# Patient Record
Sex: MALE | Race: WHITE | NOT HISPANIC OR LATINO | Employment: OTHER | URBAN - METROPOLITAN AREA
[De-identification: names, ages, dates, MRNs, and addresses within clinical notes are randomized per-mention and may not be internally consistent; named-entity substitution may affect disease eponyms.]

---

## 2022-11-02 ENCOUNTER — OFFICE VISIT (OUTPATIENT)
Dept: URGENT CARE | Facility: CLINIC | Age: 42
End: 2022-11-02
Payer: OTHER GOVERNMENT

## 2022-11-02 VITALS
HEART RATE: 82 BPM | DIASTOLIC BLOOD PRESSURE: 89 MMHG | SYSTOLIC BLOOD PRESSURE: 126 MMHG | WEIGHT: 238 LBS | RESPIRATION RATE: 20 BRPM | OXYGEN SATURATION: 98 % | BODY MASS INDEX: 31.54 KG/M2 | HEIGHT: 73 IN | TEMPERATURE: 98 F

## 2022-11-02 DIAGNOSIS — J06.9 URI WITH COUGH AND CONGESTION: Primary | ICD-10-CM

## 2022-11-02 DIAGNOSIS — R89.4 INFLUENZA A VIRUS NOT DETECTED: ICD-10-CM

## 2022-11-02 DIAGNOSIS — Z20.822 COVID-19 VIRUS NOT DETECTED: ICD-10-CM

## 2022-11-02 LAB
CTP QC/QA: YES
CTP QC/QA: YES
FLUAV AG NPH QL: NEGATIVE
FLUBV AG NPH QL: NEGATIVE
SARS-COV-2 AG RESP QL IA.RAPID: NEGATIVE

## 2022-11-02 PROCEDURE — 87804 POCT INFLUENZA A/B: ICD-10-PCS | Mod: QW,,, | Performed by: NURSE PRACTITIONER

## 2022-11-02 PROCEDURE — 99204 PR OFFICE/OUTPT VISIT, NEW, LEVL IV, 45-59 MIN: ICD-10-PCS | Mod: S$GLB,,, | Performed by: NURSE PRACTITIONER

## 2022-11-02 PROCEDURE — 87811 SARS-COV-2 COVID19 W/OPTIC: CPT | Mod: QW,S$GLB,, | Performed by: NURSE PRACTITIONER

## 2022-11-02 PROCEDURE — 87804 INFLUENZA ASSAY W/OPTIC: CPT | Mod: QW,,, | Performed by: NURSE PRACTITIONER

## 2022-11-02 PROCEDURE — 87811 SARS CORONAVIRUS 2 ANTIGEN POCT, MANUAL READ: ICD-10-PCS | Mod: QW,S$GLB,, | Performed by: NURSE PRACTITIONER

## 2022-11-02 PROCEDURE — 99204 OFFICE O/P NEW MOD 45 MIN: CPT | Mod: S$GLB,,, | Performed by: NURSE PRACTITIONER

## 2022-11-02 RX ORDER — FLUTICASONE PROPIONATE 50 MCG
2 SPRAY, SUSPENSION (ML) NASAL DAILY PRN
Qty: 15.8 ML | Refills: 0 | Status: SHIPPED | OUTPATIENT
Start: 2022-11-02 | End: 2022-11-16

## 2022-11-02 RX ORDER — PROMETHAZINE HYDROCHLORIDE AND DEXTROMETHORPHAN HYDROBROMIDE 6.25; 15 MG/5ML; MG/5ML
5 SYRUP ORAL NIGHTLY PRN
Qty: 118 ML | Refills: 0 | Status: SHIPPED | OUTPATIENT
Start: 2022-11-02 | End: 2022-11-12

## 2022-11-02 RX ORDER — BROMPHENIRAM/PHENYLEPHRINE/DM 1-2.5-5/5
20 SOLUTION, ORAL ORAL EVERY 4 HOURS PRN
Qty: 118 ML | Refills: 0 | Status: SHIPPED | OUTPATIENT
Start: 2022-11-02 | End: 2022-11-09

## 2022-11-02 NOTE — PATIENT INSTRUCTIONS
Increase clear fluid intake  Stop all current over the counter cough, cold, flu medicine  Tylenol/motrin otc for fever or pain  Flonase nasal spray and Dimetap syrup for sinus congestion and pressure.  Take Mucinex DM as needed for chest congestion and coughing. Take mucinex with a full glass of water at each dose  May take promethazine dm cough syrup for severe nighttime cough.  Saltwater gargles 4 x daily and benzocaine anesthetic throat lozenges for sore throat. May also add honey based cough syrup  Follow up with PCP  Go immediately to the nearest emergency room for shortness of breath, chest pain,  or other emergent concern.  Return to clinic for new, worse, or unresolving symptoms

## 2022-11-02 NOTE — PROGRESS NOTES
"Subjective:       Patient ID: Jinny Rosado is a 42 y.o. male.    Vitals:  height is 6' 1" (1.854 m) and weight is 108 kg (238 lb). His temperature is 98 °F (36.7 °C). His blood pressure is 126/89 and his pulse is 82. His respiration is 20 and oxygen saturation is 98%.     Chief Complaint: Chest Congestion    Pt states" c/o daughter was Dx with flu last week. Chest congestion, cough, fatigue, chills, feels hot and flushed that has been going on for 2 days."       Constitution: Positive for chills. Negative for fever.   HENT:  Positive for congestion and sore throat. Negative for ear pain and sinus pressure.    Neck: Negative for painful lymph nodes.   Cardiovascular:  Negative for chest pain, palpitations and sob on exertion.   Respiratory:  Positive for cough and sputum production. Negative for shortness of breath.    Gastrointestinal:  Negative for abdominal pain, nausea, vomiting and diarrhea.   Musculoskeletal:  Negative for muscle ache.   Skin:  Negative for rash.   Neurological:  Negative for dizziness, light-headedness, passing out, disorientation and altered mental status.   Hematologic/Lymphatic: Negative for swollen lymph nodes.   Psychiatric/Behavioral:  Negative for altered mental status, disorientation and confusion.      Objective:      Physical Exam   Constitutional: He is oriented to person, place, and time. He appears well-developed. He is cooperative.  Non-toxic appearance. He does not appear ill. No distress.   HENT:   Head: Normocephalic and atraumatic.   Ears:   Right Ear: Hearing, tympanic membrane, external ear and ear canal normal.   Left Ear: Hearing, tympanic membrane, external ear and ear canal normal.   Nose: Rhinorrhea present. No mucosal edema, nasal deformity or congestion. No epistaxis. Right sinus exhibits no maxillary sinus tenderness and no frontal sinus tenderness. Left sinus exhibits no maxillary sinus tenderness and no frontal sinus tenderness.   Mouth/Throat: Uvula is " midline and mucous membranes are normal. No trismus in the jaw. Normal dentition. No uvula swelling. Posterior oropharyngeal erythema present. No oropharyngeal exudate, posterior oropharyngeal edema, tonsillar abscesses or cobblestoning. Tonsils are 1+ on the right. Tonsils are 1+ on the left. No tonsillar exudate.   Eyes: Conjunctivae and lids are normal. No scleral icterus.   Neck: Trachea normal and phonation normal. Neck supple. No edema present. No erythema present. No neck rigidity present.   Cardiovascular: Normal rate, regular rhythm, normal heart sounds and normal pulses.   Pulmonary/Chest: Effort normal and breath sounds normal. No respiratory distress. He has no decreased breath sounds. He has no rhonchi.   Abdominal: Normal appearance.   Musculoskeletal: Normal range of motion.         General: No deformity. Normal range of motion.   Lymphadenopathy:     He has no cervical adenopathy.   Neurological: He is alert and oriented to person, place, and time. He exhibits normal muscle tone. Coordination normal.   Skin: Skin is warm, dry, intact, not diaphoretic and not pale. Capillary refill takes 2 to 3 seconds.   Psychiatric: His speech is normal and behavior is normal. Judgment and thought content normal.   Nursing note and vitals reviewed.      Assessment:       1. URI with cough and congestion    2. COVID-19 virus not detected    3. Influenza A virus not detected          Plan:         URI with cough and congestion  -     POCT Influenza A/B  -     SARS Coronavirus 2 Antigen, POCT Manual Read  -     benzocaine-menthoL 6-10 mg lozenge; Take 1 lozenge by mouth every 2 (two) hours as needed for Pain.  Dispense: 18 tablet; Refill: 0  -     brompheniramine-phenylephrine (DIMETAPP COLD-ALLERGY, PE,) 1-2.5 mg/5 mL Soln; Take 20 mLs by mouth every 4 (four) hours as needed (cough/congestion).  Dispense: 118 mL; Refill: 0  -     fluticasone propionate (FLONASE) 50 mcg/actuation nasal spray; 2 sprays (100 mcg total)  by Each Nostril route daily as needed for Rhinitis.  Dispense: 15.8 mL; Refill: 0  -     promethazine-dextromethorphan (PROMETHAZINE-DM) 6.25-15 mg/5 mL Syrp; Take 5 mLs by mouth nightly as needed (cough). Take as needed for nighttime coughing  Dispense: 118 mL; Refill: 0  -     dextromethorphan-guaiFENesin  mg (MUCINEX DM)  mg per 12 hr tablet; Take 1 tablet by mouth 2 (two) times daily as needed (cough/congestion).  Dispense: 14 tablet; Refill: 0    COVID-19 virus not detected    Influenza A virus not detected     I have discussed the test results and physical exam findings with the patient. We discussed symptom monitoring, conservative care methods, medication use, and follow up orders. The patient verbalized understanding and agreement with the plan of care.        Increase clear fluid intake  Stop all current over the counter cough, cold, flu medicine  Tylenol/motrin otc for fever or pain  Flonase nasal spray and Dimetap syrup for sinus congestion and pressure.  Take Mucinex DM as needed for chest congestion and coughing. Take mucinex with a full glass of water at each dose  May take promethazine dm cough syrup for severe nighttime cough.  Saltwater gargles 4 x daily and benzocaine anesthetic throat lozenges for sore throat. May also add honey based cough syrup  Follow up with PCP  Go immediately to the nearest emergency room for shortness of breath, chest pain,  or other emergent concern.  Return to clinic for new, worse, or unresolving symptoms

## 2023-02-06 ENCOUNTER — OFFICE VISIT (OUTPATIENT)
Dept: URGENT CARE | Facility: CLINIC | Age: 43
End: 2023-02-06
Payer: OTHER GOVERNMENT

## 2023-02-06 VITALS
RESPIRATION RATE: 16 BRPM | SYSTOLIC BLOOD PRESSURE: 126 MMHG | DIASTOLIC BLOOD PRESSURE: 90 MMHG | OXYGEN SATURATION: 97 % | BODY MASS INDEX: 32.2 KG/M2 | TEMPERATURE: 98 F | WEIGHT: 243 LBS | HEIGHT: 73 IN | HEART RATE: 96 BPM

## 2023-02-06 DIAGNOSIS — L03.213 PRESEPTAL CELLULITIS OF RIGHT LOWER EYELID: ICD-10-CM

## 2023-02-06 DIAGNOSIS — H10.9 CONJUNCTIVITIS, UNSPECIFIED CONJUNCTIVITIS TYPE, UNSPECIFIED LATERALITY: Primary | ICD-10-CM

## 2023-02-06 PROCEDURE — 99214 OFFICE O/P EST MOD 30 MIN: CPT | Mod: S$GLB,,, | Performed by: NURSE PRACTITIONER

## 2023-02-06 PROCEDURE — 99214 PR OFFICE/OUTPT VISIT, EST, LEVL IV, 30-39 MIN: ICD-10-PCS | Mod: S$GLB,,, | Performed by: NURSE PRACTITIONER

## 2023-02-06 RX ORDER — CLINDAMYCIN HYDROCHLORIDE 300 MG/1
300 CAPSULE ORAL EVERY 6 HOURS
Qty: 20 CAPSULE | Refills: 0 | Status: SHIPPED | OUTPATIENT
Start: 2023-02-06 | End: 2023-02-11

## 2023-02-06 RX ORDER — OFLOXACIN 3 MG/ML
1 SOLUTION/ DROPS OPHTHALMIC 4 TIMES DAILY
Qty: 5 ML | Refills: 0 | Status: SHIPPED | OUTPATIENT
Start: 2023-02-06 | End: 2023-02-13

## 2023-02-06 NOTE — LETTER
February 6, 2023      [facility]  [address]  [facility] [address]  Phone: [phone]       Patient: Jinny Rosado   YOB: 1980  Date of Visit: 02/06/2023    To Whom It May Concern:    Bishnu Rosado  was at [facility] on 02/06/2023. The patient may return to work/school after 24 hours of antibiotics and/or symptoms are improving. If you have any questions or concerns, or if I can be of further assistance, please do not hesitate to contact me.    Sincerely,    Celi Saleh, NP

## 2023-02-06 NOTE — PATIENT INSTRUCTIONS
Clindamycin 3 times a day for 5 days.  It is always advised to take probiotics for intestinal health over-the-counter while taking any antibiotic.    Ofloxacin eyedrops as prescribed.    As we discussed please seek medical re-evaluation if symptoms worsen or fail to improve after 48-72 hours on medications, onset of fever/chills/nausea/vomiting/diarrhea, onset of eye pain or pain with movement or visual changes

## 2023-02-06 NOTE — PROGRESS NOTES
"Subjective:       Patient ID: Jinny Rosado is a 43 y.o. male.    Vitals:  height is 6' 1" (1.854 m) and weight is 110.2 kg (243 lb). His temperature is 97.6 °F (36.4 °C). His blood pressure is 126/90 (abnormal) and his pulse is 96. His respiration is 16 and oxygen saturation is 97%.     Chief Complaint: Eye Problem    Pt states Friday afternoon right eye became inflamed and fed, states it is painful has 3 bumps on eye. Denies itching.     Onset of symptoms 4 days.  Denies injury or trauma.  Denies sensation of foreign body.  Denies wearing contacts or any known precipitating event or occurrence    Constitution: Negative for chills and fever.   Eyes:  Positive for eye redness and eyelid swelling (Right lower lid). Negative for eye trauma, foreign body in eye, eye discharge (excessive water), eye itching, eye pain, photophobia, vision loss, double vision and blurred vision.   Gastrointestinal:  Negative for nausea, vomiting and diarrhea.     Objective:      Physical Exam   Constitutional: He is oriented to person, place, and time. He appears well-developed.  Non-toxic appearance. He does not appear ill. No distress.   HENT:   Head: Normocephalic and atraumatic.   Nose: Nose normal.   Mouth/Throat: Oropharynx is clear and moist. Mucous membranes are moist.   Eyes: EOM are normal. Pupils are equal, round, and reactive to light. Right eye exhibits no chemosis, no discharge, no exudate and no hordeolum. No foreign body present in the right eye. Left eye exhibits no discharge and no hordeolum. Right conjunctiva is injected. Right conjunctiva has no hemorrhage. Right eye exhibits normal extraocular motion.   Slit lamp exam:       The right eye shows no fluorescein uptake. Extraocular movement intact vision grossly intact      Comments: See attached photo   Cardiovascular: Normal rate.   Pulmonary/Chest: Effort normal. No respiratory distress.   Abdominal: Normal appearance.   Neurological: no focal deficit. He is alert " and oriented to person, place, and time.   Skin: Skin is warm and dry. Capillary refill takes 2 to 3 seconds.   Psychiatric: His behavior is normal. Mood normal.   Nursing note and vitals reviewed.      Assessment:       1. Conjunctivitis, unspecified conjunctivitis type, unspecified laterality    2. Preseptal cellulitis of right lower eyelid        Wood's lamp exam in clinic negative with no fluorescein uptake  Plan:         Conjunctivitis, unspecified conjunctivitis type, unspecified laterality  -     ofloxacin (OCUFLOX) 0.3 % ophthalmic solution; Place 1 drop into the right eye 4 (four) times daily. for 7 days  Dispense: 5 mL; Refill: 0    Preseptal cellulitis of right lower eyelid  -     clindamycin (CLEOCIN) 300 MG capsule; Take 1 capsule (300 mg total) by mouth every 6 (six) hours. for 5 days  Dispense: 20 capsule; Refill: 0

## 2023-04-19 ENCOUNTER — TELEPHONE (OUTPATIENT)
Dept: HEMATOLOGY/ONCOLOGY | Facility: CLINIC | Age: 43
End: 2023-04-19

## 2023-04-19 NOTE — TELEPHONE ENCOUNTER
----- Message from Sue Jimenez sent at 4/17/2023 10:20 AM CDT -----  The patient has a new patient referral from [facility] in [facility] and he called about scheduling an appointment.

## 2023-05-16 DIAGNOSIS — M79.641 BILATERAL HAND PAIN: Primary | ICD-10-CM

## 2023-05-16 DIAGNOSIS — M79.642 BILATERAL HAND PAIN: Primary | ICD-10-CM

## 2023-05-17 ENCOUNTER — TELEPHONE (OUTPATIENT)
Dept: ORTHOPEDICS | Facility: CLINIC | Age: 43
End: 2023-05-17
Payer: OTHER GOVERNMENT

## 2023-05-17 NOTE — TELEPHONE ENCOUNTER
Spoke with the patient. Informed of X-rays scheduled prior to appointment. Patient verbalized understanding and was thankful.       Xochilt Shine MA  Medical Assistant to Dr. Ross Dunbar [facility]

## 2023-05-18 ENCOUNTER — OFFICE VISIT (OUTPATIENT)
Dept: ORTHOPEDICS | Facility: CLINIC | Age: 43
End: 2023-05-18
Payer: OTHER GOVERNMENT

## 2023-05-18 ENCOUNTER — HOSPITAL ENCOUNTER (OUTPATIENT)
Dept: RADIOLOGY | Facility: HOSPITAL | Age: 43
Discharge: HOME | End: 2023-05-18
Attending: ORTHOPAEDIC SURGERY
Payer: OTHER GOVERNMENT

## 2023-05-18 DIAGNOSIS — M79.641 BILATERAL HAND PAIN: ICD-10-CM

## 2023-05-18 DIAGNOSIS — G56.03 BILATERAL CARPAL TUNNEL SYNDROME: Primary | ICD-10-CM

## 2023-05-18 DIAGNOSIS — G56.23 CUBITAL TUNNEL SYNDROME, BILATERAL: ICD-10-CM

## 2023-05-18 DIAGNOSIS — M79.642 BILATERAL HAND PAIN: ICD-10-CM

## 2023-05-18 PROCEDURE — 73130 X-RAY EXAM OF HAND: CPT | Mod: TC,50,FY

## 2023-05-18 PROCEDURE — 99212 OFFICE O/P EST SF 10 MIN: CPT | Mod: PBBFAC,PO | Performed by: ORTHOPAEDIC SURGERY

## 2023-05-18 PROCEDURE — 99999 PR PBB SHADOW E&M-EST. PATIENT-LVL II: ICD-10-PCS | Mod: PBBFAC,,, | Performed by: ORTHOPAEDIC SURGERY

## 2023-05-18 PROCEDURE — 99999 PR PBB SHADOW E&M-EST. PATIENT-LVL II: CPT | Mod: PBBFAC,,, | Performed by: ORTHOPAEDIC SURGERY

## 2023-05-18 PROCEDURE — 99204 PR OFFICE/OUTPT VISIT, NEW, LEVL IV, 45-59 MIN: ICD-10-PCS | Mod: S$PBB,,, | Performed by: ORTHOPAEDIC SURGERY

## 2023-05-18 PROCEDURE — 73130 XR HAND COMPLETE 3 VIEWS BILATERAL: ICD-10-PCS | Mod: 26,50,, | Performed by: RADIOLOGY

## 2023-05-18 PROCEDURE — 73130 X-RAY EXAM OF HAND: CPT | Mod: 26,50,, | Performed by: RADIOLOGY

## 2023-05-18 PROCEDURE — 99204 OFFICE O/P NEW MOD 45 MIN: CPT | Mod: S$PBB,,, | Performed by: ORTHOPAEDIC SURGERY

## 2023-05-18 NOTE — PROGRESS NOTES
[facility]  History & Physical  Orthopedics    SUBJECTIVE:      COVID-19 attestation:  This patient was treated during the COVID-19 pandemic.  This was discussed with the patient, they are aware of our current policies and procedures, were given the option of delaying their visit and or switching to a virtual visit, delaying their surgery when applicable, and they elect to proceed.    Chief Complaint:  Bilateral hand pain numbness and tingling    Referring Provider: Self, Aaareferral     History of Present Illness:  Patient is a 43 y.o. ambidextrous male who presents today with complaints of bilateral hand pain numbness and tingling.  The patient notes constant tingling sensations worst to the bilateral thumb index and long fingers.  This has been going on for several years.  He has attempted ibuprofen and nocturnal carpal tunnel bracing with minimal relief.  He presents today for initial evaluation with no other complaints.     The patient is a/an  with the [employer].    Onset of symptoms/DOI was several years ago.    Symptoms are aggravated by activity and movement.    Symptoms are alleviated by nothing    Symptoms consist of pain and numbness/tingling.    The patient rates their pain as a 3/10.    Attempted treatment(s) and/or interventions include activity modifications, rest, anti-inflammatory medications and immobilization.     The patient denies any fevers, chills, N/V, D/C and presents for evaluation.       No past medical history on file.  No past surgical history on file.  Review of patient's allergies indicates:  No Known Allergies  Social History     Social History Narrative    Not on file     No family history on file.    No current outpatient medications on file.      Review of Systems:  As per HPI otherwise noncontributory    OBJECTIVE:      Vital Signs (Most Recent):  There were no vitals filed for this visit.  There is no height or weight on file to calculate  BMI.      Physical Exam:  Constitutional: The patient appears well-developed and well-nourished. No distress.   Skin: No lesions appreciated  Head: Normocephalic and atraumatic.   Nose: Nose normal.   Ears: No deformities seen  Eyes: Conjunctivae and EOM are normal.   Neck: No tracheal deviation present.   Cardiovascular: Normal rate and intact distal pulses.    Pulmonary/Chest: Effort normal. No respiratory distress.   Abdominal: There is no guarding.   Neurological: The patient is alert.   Psychiatric: The patient has a normal mood and affect.     Bilateral Hand/Wrist Examination:    Observation/Inspection:  Swelling  none    Deformity  none  Discoloration  none     Scars   none    Atrophy  none    HAND/WRIST EXAMINATION:  Finkelstein's Test   Neg  WHAT Test    Neg  Snuff box tenderness   Neg  Barber's Test    Neg  Hook of Hamate Tenderness  Neg  CMC grind    Neg  Circumduction test   Neg    Neurovascular Exam:  Digits WWP, brisk CR < 3s throughout  NVI motor/LTS to M/R/U nerves, radial pulse 2+  Tinel's Test - Carpal Tunnel  positive  Tinel's Test - Cubital Tunnel  positive  Phalen's Test    positive  Median Nerve Compression Test positive    ROM hand full, painless    ROM wrist full, painless    ROM elbow full, painless    Abdomen not guarded  Respirations nonlabored  Perfusion intact    Diagnostic Results:     Imaging - I independently viewed the patient's imaging as well as the radiology report.  Xrays of the patient's bilateral hands  demonstrate no evidence of any acute fractures or dislocations with mild D IP degenerative changes.    EMG - none    ASSESSMENT/PLAN:      43 y.o. yo male with bilateral carpal greater than cubital tunnel syndromes  Plan: The patient and I had a thorough discussion today.  We discussed the working diagnosis as well as several other potential alternative diagnoses.  Treatment options were discussed, both conservative and surgical.  Conservative treatment options would include  things such as activity modifications, workplace modifications, a period of rest, oral vs topical OTC and prescription anti-inflammatory medications, occupational therapy, splinting/bracing, immobilization, corticosteroid injections, and others.  Surgical options were discussed as well.     At this time, the patient would like to proceed with a trial of EMG to evaluate for carpal and/or cubital tunnel syndromes.  He is already attempting NSAIDs and nocturnal carpal tunnel splinting.  Follow-up after EMG for these results and recommendations moving forward.    Should the patient's symptoms worsen, persist, or fail to improve they should return for reevaluation and I would be happy to see them back anytime.        Hilton Perez M.D.    Please be aware that this note has been generated with the assistance of [employer] voice-to-text.  Please excuse any spelling or grammatical errors.    Thank you for choosing Dr. Hilton Perez for your orthopedic hand and upper extremity care. It is our goal to provide you with exceptional care that will help keep you healthy, active, and get you back in the game.     If you felt that you received exemplary care today, please consider leaving feedback for Dr. Perez on [employer] at [email or web address].    Please do not hesitate to reach out to us via email, phone, or MyChart with any questions, concerns, or feedback.

## 2023-07-06 ENCOUNTER — PROCEDURE VISIT (OUTPATIENT)
Dept: NEUROLOGY | Facility: CLINIC | Age: 43
End: 2023-07-06
Payer: OTHER GOVERNMENT

## 2023-07-06 DIAGNOSIS — G56.23 CUBITAL TUNNEL SYNDROME, BILATERAL: ICD-10-CM

## 2023-07-06 DIAGNOSIS — G56.03 BILATERAL CARPAL TUNNEL SYNDROME: ICD-10-CM

## 2023-07-06 PROCEDURE — 99203 OFFICE O/P NEW LOW 30 MIN: CPT | Mod: 25,S$PBB,, | Performed by: PSYCHIATRY & NEUROLOGY

## 2023-07-06 PROCEDURE — 95913 NRV CNDJ TEST 13/> STUDIES: CPT | Mod: 26,S$PBB,, | Performed by: PSYCHIATRY & NEUROLOGY

## 2023-07-06 PROCEDURE — 95886 MUSC TEST DONE W/N TEST COMP: CPT | Mod: 26,S$PBB,, | Performed by: PSYCHIATRY & NEUROLOGY

## 2023-07-06 PROCEDURE — 95913 PR NERVE CONDUCTION STUDY; 13 OR MORE STUDIES: ICD-10-PCS | Mod: 26,S$PBB,, | Performed by: PSYCHIATRY & NEUROLOGY

## 2023-07-06 PROCEDURE — 95886 MUSC TEST DONE W/N TEST COMP: CPT | Mod: PBBFAC | Performed by: PSYCHIATRY & NEUROLOGY

## 2023-07-06 PROCEDURE — 95913 NRV CNDJ TEST 13/> STUDIES: CPT | Mod: PBBFAC | Performed by: PSYCHIATRY & NEUROLOGY

## 2023-07-06 PROCEDURE — 95886 PR EMG COMPLETE, W/ NERVE CONDUCTION STUDIES, 5+ MUSCLES: ICD-10-PCS | Mod: 26,S$PBB,, | Performed by: PSYCHIATRY & NEUROLOGY

## 2023-07-06 PROCEDURE — 99203 PR OFFICE/OUTPT VISIT, NEW, LEVL III, 30-44 MIN: ICD-10-PCS | Mod: 25,S$PBB,, | Performed by: PSYCHIATRY & NEUROLOGY

## 2023-07-06 NOTE — PROGRESS NOTES
[facility] [address] Neurology    Subjective:       Patient ID: Jinny Rosado is a 43 y.o. male here for a EMG focused evaluation for arm pain. Previous visits and diagnostic evaluation reviewed.  Patient describes at least 2 years of progressive bilateral hand pain and numbness involving the 1st 3 digits.  Symptoms are worse with activities such as driving and use of hands.  He describes occasional neck pain.  Symptoms have been progressively worsening and severe at times.   HPI  Review of patient's allergies indicates:  No Known Allergies   There were no vitals filed for this visit.   Chief Complaint: No chief complaint on file.    History reviewed. No pertinent past medical history.   Social History     Socioeconomic History    Marital status: Unknown      Review of Systems:   No Fever  No SOB  No vomiting  No visual disturbance      Objective:      Physical Exam    Constitutional: Patient appears well-nourished.   Head: Normocephalic and atraumatic.   Mouth/Throat: Oropharynx is clear and moist.   Pulmonary/Chest: Effort normal.   Abdominal: Soft.   Skin: Skin is warm and dry.      General:  Patient is alert and cooperative.  Affect:  Patient is appropriate to surroundings and environment.  Language:  Speech is fluent.  HEENT:  There are no outward signs of trauma to head or face.  Cranial Nerves:  Pupils are equal round and reactive to light. Extra-ocular movements are intact. Face, tongue, and palate are symmetrical.  Motor:  Patient exhibits normal strength testing in bilateral proximal and distal upper extremities.  Reflexes:  Symmetrical in bilateral upper extremities.  Gait:  Ambulation is independent without use of cane or walker without signs of ataxia or circumduction.  Cerebellar:  Normal finger to nose testing without dysmetria.  Sensory:  Intact to sensory modalities tested.  Musculoskeletal:  There is no severe tenderness  to palpation and manipulation of the cervical spine region.   Assessment:       We reviewed and discussed at length results of EMG performed today confirming moderate right and mild left carpal tunnel syndrome as well as a left ulnar neuropathy best localized to the across elbow segment.  Also noted was evidence of a mild chronic right C7 radiculopathy.  These findings are available via media section of chart review.   1. Bilateral carpal tunnel syndrome    2. Cubital tunnel syndrome, bilateral              Plan:       We discussed treatment options at length. Recommend patient keep appointment with referring provider.       We specifically discussed the pathophysiology of carpal tunnel syndrome and treatment options including bracing, injections and possible surgical intervention.     I spent a total of 35 minutes on the day of the visit. This includes face to face time and non-face to face time preparing to see the patient (eg, review of tests), obtaining and/or reviewing separately obtained history, documenting clinical information in the electronic or other health record, independently interpreting results and communicating results to the patient/family/caregiver, or care coordinator  .  Álvaro Aguiar MD, FAAN   07/06/2023   9:54 AM     A dictation device was used to produce this document. Use of such devices sometimes results in grammatical errors or replacement of words that sound similarly.

## 2023-07-20 ENCOUNTER — OFFICE VISIT (OUTPATIENT)
Dept: ORTHOPEDICS | Facility: CLINIC | Age: 43
End: 2023-07-20
Payer: OTHER GOVERNMENT

## 2023-07-20 VITALS — HEIGHT: 73 IN | WEIGHT: 243 LBS | BODY MASS INDEX: 32.2 KG/M2

## 2023-07-20 DIAGNOSIS — M54.12 CERVICAL RADICULOPATHY: Primary | ICD-10-CM

## 2023-07-20 PROCEDURE — 99214 PR OFFICE/OUTPT VISIT, EST, LEVL IV, 30-39 MIN: ICD-10-PCS | Mod: S$PBB,,, | Performed by: ORTHOPAEDIC SURGERY

## 2023-07-20 PROCEDURE — 99999 PR PBB SHADOW E&M-EST. PATIENT-LVL III: ICD-10-PCS | Mod: PBBFAC,,, | Performed by: ORTHOPAEDIC SURGERY

## 2023-07-20 PROCEDURE — 99214 OFFICE O/P EST MOD 30 MIN: CPT | Mod: S$PBB,,, | Performed by: ORTHOPAEDIC SURGERY

## 2023-07-20 PROCEDURE — 99213 OFFICE O/P EST LOW 20 MIN: CPT | Mod: PBBFAC,PO | Performed by: ORTHOPAEDIC SURGERY

## 2023-07-20 PROCEDURE — 99999 PR PBB SHADOW E&M-EST. PATIENT-LVL III: CPT | Mod: PBBFAC,,, | Performed by: ORTHOPAEDIC SURGERY

## 2023-07-20 NOTE — PROGRESS NOTES
[facility]  History & Physical  Orthopedics    SUBJECTIVE:      COVID-19 attestation:  This patient was treated during the COVID-19 pandemic.  This was discussed with the patient, they are aware of our current policies and procedures, were given the option of delaying their visit and or switching to a virtual visit, delaying their surgery when applicable, and they elect to proceed.    Chief Complaint:  Bilateral hand pain numbness and tingling    Referring Provider: No ref. provider found     History of Present Illness:  Patient is a 43 y.o. ambidextrous male who presents today with complaints of bilateral hand pain numbness and tingling.  The patient notes constant tingling sensations worst to the bilateral thumb index and long fingers.  This has been going on for several years.  He has attempted ibuprofen and nocturnal carpal tunnel bracing with minimal relief.  He presents today for initial evaluation with no other complaints.     Interval history July 20, 2023: The patient returns today for re-evaluation.  Notes that the pain numbness and tingling in his bilateral right greater than left hands persist.  He had a recent EMG and returns for those results and re-evaluation.    The patient is a/an  with the [employer].    Onset of symptoms/DOI was several years ago.    Symptoms are aggravated by activity and movement.    Symptoms are alleviated by nothing    Symptoms consist of pain and numbness/tingling.    The patient rates their pain as a 3/10.    Attempted treatment(s) and/or interventions include activity modifications, rest, anti-inflammatory medications and immobilization.     The patient denies any fevers, chills, N/V, D/C and presents for evaluation.       No past medical history on file.  No past surgical history on file.  Review of patient's allergies indicates:  No Known Allergies  Social History     Social History Narrative    Not on file     No family history on  "file.    No current outpatient medications on file.      Review of Systems:  As per HPI otherwise noncontributory    OBJECTIVE:      Vital Signs (Most Recent):  Vitals:    07/20/23 0831   Weight: 110.2 kg (243 lb)   Height: 6' 1" (1.854 m)     Body mass index is 32.06 kg/m².      Physical Exam:  Constitutional: The patient appears well-developed and well-nourished. No distress.   Skin: No lesions appreciated  Head: Normocephalic and atraumatic.   Nose: Nose normal.   Ears: No deformities seen  Eyes: Conjunctivae and EOM are normal.   Neck: No tracheal deviation present.   Cardiovascular: Normal rate and intact distal pulses.    Pulmonary/Chest: Effort normal. No respiratory distress.   Abdominal: There is no guarding.   Neurological: The patient is alert.   Psychiatric: The patient has a normal mood and affect.     Bilateral Hand/Wrist Examination:    Observation/Inspection:  Swelling  none    Deformity  none  Discoloration  none     Scars   none    Atrophy  none    HAND/WRIST EXAMINATION:  Finkelstein's Test   Neg  WHAT Test    Neg  Snuff box tenderness   Neg  Barber's Test    Neg  Hook of Hamate Tenderness  Neg  CMC grind    Neg  Circumduction test   Neg    Neurovascular Exam:  Digits WWP, brisk CR < 3s throughout  NVI motor/LTS to M/R/U nerves, radial pulse 2+  Tinel's Test - Carpal Tunnel  positive  Tinel's Test - Cubital Tunnel  positive  Phalen's Test    positive  Median Nerve Compression Test positive    ROM hand full, painless    ROM wrist full, painless    ROM elbow full, painless    Abdomen not guarded  Respirations nonlabored  Perfusion intact    Diagnostic Results:     Imaging - I independently viewed the patient's imaging as well as the radiology report.  Xrays of the patient's bilateral hands  demonstrate no evidence of any acute fractures or dislocations with mild D IP degenerative changes.    EMG   -     ASSESSMENT/PLAN:      43 y.o. yo male with bilateral carpal greater than cubital tunnel " syndromes  Plan: The patient and I had a thorough discussion today.  We discussed the working diagnosis as well as several other potential alternative diagnoses.  Treatment options were discussed, both conservative and surgical.  Conservative treatment options would include things such as activity modifications, workplace modifications, a period of rest, oral vs topical OTC and prescription anti-inflammatory medications, occupational therapy, splinting/bracing, immobilization, corticosteroid injections, and others.  Surgical options were discussed as well.     At this time, we discussed options.  His EMG demonstrated bilateral carpal tunnel syndrome and left cubital tunnel syndrome and right upper extremity cervical radiculopathy.  Despite not demonstrating right cubital tunnel syndrome he certainly has involvement of his ulnar nerve distribution with a Tinel's at the right elbow.  He would like to proceed with a right carpal and cubital tunnel decompression.  He will need to consult with his work and family prior to picking a date.  He will return on Wednesday in clinic with Jamie Russo DiGeorge for preoperative visit and consenting and is amenable to doing surgery at [facility] as he understands that we are transitioning away from our [place] practice at this time.  Additionally, I will refer him to spine given his C7 radiculopathy on his EMG.      Should the patient's symptoms worsen, persist, or fail to improve they should return for reevaluation and I would be happy to see them back anytime.        Hilton Perez M.D.    Please be aware that this note has been generated with the assistance of [employer] voice-to-text.  Please excuse any spelling or grammatical errors.    Thank you for choosing Dr. Hilton Perez for your orthopedic hand and upper extremity care. It is our goal to provide you with exceptional care that will help keep you healthy, active, and get you back in the game.     If you felt that you received  exemplary care today, please consider leaving feedback for Dr. Perez on [email or web address] at [email or web address].    Please do not hesitate to reach out to us via email, phone, or MyChart with any questions, concerns, or feedback.

## 2023-07-24 ENCOUNTER — OFFICE VISIT (OUTPATIENT)
Dept: PULMONOLOGY | Facility: CLINIC | Age: 43
End: 2023-07-24
Payer: OTHER GOVERNMENT

## 2023-07-24 ENCOUNTER — HOSPITAL ENCOUNTER (OUTPATIENT)
Dept: RADIOLOGY | Facility: HOSPITAL | Age: 43
Discharge: HOME | End: 2023-07-24
Attending: INTERNAL MEDICINE
Payer: OTHER GOVERNMENT

## 2023-07-24 VITALS
HEIGHT: 73 IN | BODY MASS INDEX: 32.02 KG/M2 | DIASTOLIC BLOOD PRESSURE: 87 MMHG | SYSTOLIC BLOOD PRESSURE: 125 MMHG | HEART RATE: 85 BPM | WEIGHT: 241.63 LBS | OXYGEN SATURATION: 97 %

## 2023-07-24 DIAGNOSIS — J18.9 COMMUNITY ACQUIRED PNEUMONIA OF RIGHT LOWER LOBE OF LUNG: ICD-10-CM

## 2023-07-24 DIAGNOSIS — J45.991 COUGH VARIANT ASTHMA: ICD-10-CM

## 2023-07-24 DIAGNOSIS — J18.9 COMMUNITY ACQUIRED PNEUMONIA OF RIGHT LOWER LOBE OF LUNG: Primary | ICD-10-CM

## 2023-07-24 PROCEDURE — 99999 PR PBB SHADOW E&M-EST. PATIENT-LVL IV: ICD-10-PCS | Mod: PBBFAC,,, | Performed by: INTERNAL MEDICINE

## 2023-07-24 PROCEDURE — 71046 XR CHEST PA AND LATERAL: ICD-10-PCS | Mod: 26,,, | Performed by: RADIOLOGY

## 2023-07-24 PROCEDURE — 99204 PR OFFICE/OUTPT VISIT, NEW, LEVL IV, 45-59 MIN: ICD-10-PCS | Mod: S$PBB,,, | Performed by: INTERNAL MEDICINE

## 2023-07-24 PROCEDURE — 99214 OFFICE O/P EST MOD 30 MIN: CPT | Mod: PBBFAC,PO | Performed by: INTERNAL MEDICINE

## 2023-07-24 PROCEDURE — 99999 PR PBB SHADOW E&M-EST. PATIENT-LVL IV: CPT | Mod: PBBFAC,,, | Performed by: INTERNAL MEDICINE

## 2023-07-24 PROCEDURE — 71046 X-RAY EXAM CHEST 2 VIEWS: CPT | Mod: 26,,, | Performed by: RADIOLOGY

## 2023-07-24 PROCEDURE — 71046 X-RAY EXAM CHEST 2 VIEWS: CPT | Mod: TC

## 2023-07-24 PROCEDURE — 99204 OFFICE O/P NEW MOD 45 MIN: CPT | Mod: S$PBB,,, | Performed by: INTERNAL MEDICINE

## 2023-07-24 RX ORDER — FLUTICASONE PROPIONATE AND SALMETEROL 250; 50 UG/1; UG/1
1 POWDER RESPIRATORY (INHALATION) 2 TIMES DAILY
Qty: 60 EACH | Refills: 11 | Status: SHIPPED | OUTPATIENT
Start: 2023-07-24 | End: 2024-07-23

## 2023-07-24 RX ORDER — AMOXICILLIN AND CLAVULANATE POTASSIUM 875; 125 MG/1; MG/1
1 TABLET, FILM COATED ORAL EVERY 12 HOURS
Qty: 14 TABLET | Refills: 0 | Status: ON HOLD | OUTPATIENT
Start: 2023-07-24 | End: 2023-10-13 | Stop reason: HOSPADM

## 2023-07-24 RX ORDER — MINERAL OIL
180 ENEMA (ML) RECTAL
Status: ON HOLD | COMMUNITY
Start: 2023-05-30 | End: 2023-10-13 | Stop reason: HOSPADM

## 2023-07-24 RX ORDER — IBUPROFEN 800 MG/1
800 TABLET ORAL 3 TIMES DAILY
COMMUNITY
Start: 2023-07-20

## 2023-07-24 RX ORDER — ALBUTEROL SULFATE 90 UG/1
AEROSOL, METERED RESPIRATORY (INHALATION)
Status: ON HOLD | COMMUNITY
Start: 2023-07-12 | End: 2023-10-13 | Stop reason: HOSPADM

## 2023-07-24 NOTE — PATIENT INSTRUCTIONS
Stop allegra and start mucinex 1200mg twice a day  Chest x-ray  Sputum sample bring to lab  Augmentin twice daily for 7 days  Advair inhaler take 1 puff twice daily- rinse mouth after use  Probably cough variant asthma but can do further testing after pneumonia is better

## 2023-07-24 NOTE — PROGRESS NOTES
7/24/2023    Jinny Rosado  New Patient Consult    Chief Complaint   Patient presents with    Cough       HPI:  07/24/2023-   Pt is a 42 yo male with productive cough, several years and worsening. For the last month his cough is productive of yellow, brown and green mucous. His spouse is present and assists w history. Cough assoc with wheeze, gurgling. Last wk went to see PCP and was referred for oncology, no further eval or treatments given.  Has tried allegra and albuterol w/ no benefit.  Cough fits with laughing, taking deep breaths.   He wears a CPAP machine (started in 2018). Lately wakes at night coughing.  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  FH father had lung ca, was a .  Pt has 1/2 ppd smoking hx about 8 yrs, quit 2008.  He sees primary Dr Keith at [facility], [place].  Had pna once in past, in high school.  They just moved here from [place]    The chief complaint problem is New to me    PFSH:  Past Medical History:   Diagnosis Date    Ankylosing spondylitis of unspecified sites in spine     Carpal tunnel syndrome on both sides     Tarsal tunnel syndrome, bilateral          Past Surgical History:   Procedure Laterality Date    left knee surgery      RHINOPLASTY      right foot surgery      VASECTOMY       Social History     Tobacco Use    Smoking status: Former     Types: Cigarettes     Quit date: 2008     Years since quitting: 15.5    Smokeless tobacco: Never     Family History   Problem Relation Age of Onset    Lung cancer Father     Breast cancer Maternal Grandmother     Lung cancer Paternal Grandmother     Lung cancer Paternal Grandfather      Review of patient's allergies indicates:  No Known Allergies    Performance Status:The patient's activity level is regular exercise.      Review of Systems:  a review of eleven systems covering constitutional, Eye, HEENT,  "Psych, Respiratory, Cardiac, GI, , Musculoskeletal, Endocrine, Dermatologic was negative except for pertinent findings as listed ABOVE and below:  Some sinus drainage  Wt gain 20#  Feels warm- temp 99  Sweats, always hot  Tightness, burning in chest    Exam:Comprehensive exam done. /87 (BP Location: Right arm, Patient Position: Sitting, BP Method: Large (Automatic))   Pulse 85   Ht 6' 1" (1.854 m)   Wt 109.6 kg (241 lb 10 oz)   SpO2 97%   BMI 31.88 kg/m²   Exam included Vitals as listed, and patient's appearance and affect and alertness and mood, oral exam for yeast and hygiene and pharynx lesions and Mallapatti (M) score, neck with inspection for jvd and masses and thyroid abnormalities and lymph nodes (supraclavicular and infraclavicular nodes and axillary also examined and noted if abn), chest exam included symmetry and effort and fremitus and percussion and auscultation, cardiac exam included rhythm and gallops and murmur and rubs and jvd and edema, abdominal exam for mass and hepatosplenomegaly and tenderness and hernias and bowel sounds, Musculoskeletal exam with muscle tone and posture and mobility/gait and  strength, and skin for rashes and cyanosis and pallor and turgor, extremity for clubbing.  Findings were normal except for pertinent findings listed below:  Oropharynx clear, M1  HR regular  Breath sounds with scant rales L lower lung, dense crackles RLL. No wheezes. Coughs with deep inspiration  No edema/clubbing      Radiographs (ct chest and cxr) reviewed: results reviewed - ordered and will review    Labs none available   No results found for: WBC, HGB, HCT, MCV, PLT    CMP  No results found for: NA, K, CL, CO2, GLU, BUN, CREATININE, CALCIUM, PROT, ALBUMIN, BILITOT, ALKPHOS, AST, ALT, ANIONGAP, EGFRNORACEVR      PFT will be done and results to be reviewed  Order next visit      Plan:  Clinical impression is reasonably certain and repeated evaluation prn +/- follow up will be needed as " below. New pt with community acquired pna, likely underlying asthma    Problem List Items Addressed This Visit          Pulmonary    Community acquired pneumonia of right lower lobe of lung - Primary    Relevant Medications    amoxicillin-clavulanate 875-125mg (AUGMENTIN) 875-125 mg per tablet    Other Relevant Orders    X-Ray Chest PA And Lateral    Culture, Respiratory with Gram Stain     Other Visit Diagnoses       Cough variant asthma        Relevant Medications    fluticasone-salmeterol diskus inhaler 250-50 mcg            Follow up in about 3 months (around 10/24/2023).    Discussed with patient above for education the following:      Patient Instructions   Stop allegra and start mucinex 1200mg twice a day  Chest x-ray  Sputum sample bring to lab  Augmentin twice daily for 7 days  Advair inhaler take 1 puff twice daily- rinse mouth after use  Probably cough variant asthma but can do further testing after pneumonia is better

## 2023-08-18 ENCOUNTER — TELEPHONE (OUTPATIENT)
Dept: ORTHOPEDICS | Facility: CLINIC | Age: 43
End: 2023-08-18
Payer: OTHER GOVERNMENT

## 2023-08-18 ENCOUNTER — OFFICE VISIT (OUTPATIENT)
Dept: SPINE | Facility: CLINIC | Age: 43
End: 2023-08-18
Payer: OTHER GOVERNMENT

## 2023-08-18 VITALS — BODY MASS INDEX: 32.02 KG/M2 | WEIGHT: 241.63 LBS | HEIGHT: 73 IN

## 2023-08-18 DIAGNOSIS — M54.12 CERVICAL RADICULOPATHY: ICD-10-CM

## 2023-08-18 DIAGNOSIS — M54.2 CERVICALGIA: Primary | ICD-10-CM

## 2023-08-18 PROCEDURE — 99204 OFFICE O/P NEW MOD 45 MIN: CPT | Mod: S$GLB,,, | Performed by: PHYSICAL MEDICINE & REHABILITATION

## 2023-08-18 PROCEDURE — 99204 PR OFFICE/OUTPT VISIT, NEW, LEVL IV, 45-59 MIN: ICD-10-PCS | Mod: S$GLB,,, | Performed by: PHYSICAL MEDICINE & REHABILITATION

## 2023-08-18 NOTE — PROGRESS NOTES
"  SUBJECTIVE:    Patient ID: Jinny Rosado is a 43 y.o. male.    Chief Complaint: Neck Pain    This is a 43-year-old man who sees Dr. Keith for his primary care.  Other than asthma he denies any chronic major medical problems.  No cancer history.  Presents with several years history of numbness and tingling in both arms primarily in both hands digits 1 through 3.  Occasional posterior neck discomfort and radiating discomfort down the right arm.  He was evaluated by Dr. Perez, hand surgeon who sent him for EMG and nerve conduction studies which showed moderate right carpal tunnel syndrome mild left carpal tunnel syndrome left ulnar neuropathy at the elbow and mild chronic right C7 radiculopathy.  He was subsequently sent here for further evaluation.  He denies difficulty writing or walking.  No bowel or bladder dysfunction fever chills sweats or unexpected weight loss.  Has noticed mild weakness in the right arm.  Pain level is 4/10.  I have no imaging to review          Past Medical History:   Diagnosis Date    Ankylosing spondylitis of unspecified sites in spine     Carpal tunnel syndrome on both sides     Tarsal tunnel syndrome, bilateral      Social History     Socioeconomic History    Marital status: Unknown   Tobacco Use    Smoking status: Former     Current packs/day: 0.00     Types: Cigarettes     Quit date: 2008     Years since quitting: 15.6    Smokeless tobacco: Never     Past Surgical History:   Procedure Laterality Date    left knee surgery      RHINOPLASTY      right foot surgery      VASECTOMY       Family History   Problem Relation Age of Onset    Lung cancer Father     Breast cancer Maternal Grandmother     Lung cancer Paternal Grandmother     Lung cancer Paternal Grandfather      Vitals:    08/18/23 0843   Weight: 109.6 kg (241 lb 10 oz)   Height: 6' 1" (1.854 m)       Review of Systems   Constitutional:  Negative for chills, diaphoresis, fatigue, fever and unexpected weight change.   HENT:  " Negative for trouble swallowing.    Eyes:  Negative for visual disturbance.   Respiratory:  Negative for shortness of breath.    Cardiovascular:  Negative for chest pain.   Gastrointestinal:  Negative for abdominal pain, constipation, diarrhea, nausea and vomiting.   Genitourinary:  Negative for difficulty urinating.   Musculoskeletal:  Negative for arthralgias, back pain, gait problem, joint swelling, myalgias, neck pain and neck stiffness.   Neurological:  Negative for dizziness, speech difficulty, weakness, light-headedness, numbness and headaches.          Objective:      Physical Exam  Neurological:      Mental Status: He is alert and oriented to person, place, and time.      Comments: He is awake and in no acute distress  No point tenderness external lesions or palpable masses about the cervical spine  Cervical range of motion is within normal limits albeit with some discomfort at the endpoints of his range particularly in extension  Reflexes- +1-+2 reflexes at the following:   C5-Biceps   C6-Brachioradialis   C7-Triceps   L3/4-Patellar   S1-Achilles   Cliff sign negative bilaterally  Four to 4+ over 5 strength right triceps otherwise his strength is normal in both upper and lower extremities.             Assessment:       1. Cervicalgia    2. Cervical radiculopathy           Plan:     Other than mild right triceps weakness he has a nonfocal neurological examination and no historical red flags.  It appears he has C7 radiculopathy on the right both clinically and electrodiagnostically.  I recommend an MRI of the cervical spine in preparation for epidural steroid injections or possibly surgical intervention depending on what we see.  Follow-up with me after the scan      Cervicalgia  -     MRI Cervical Spine Without Contrast; Future; Expected date: 08/18/2023    Cervical radiculopathy  -     Ambulatory referral/consult to Neurosurgery  -     MRI Cervical Spine Without Contrast; Future; Expected date:  08/18/2023

## 2023-08-18 NOTE — TELEPHONE ENCOUNTER
Spoke with the patient. Patient would like to proceed with CTR/CUTR on NOV 3 at [facility].     Appt booked for consenting.     All questions answered. Patient verbalized understanding and was thankful.     Jeannie Lindsay MS, OTC  Clinical & OR Assistant to Dr. Ross Dunbar [facility]  [phone]

## 2023-08-30 ENCOUNTER — OFFICE VISIT (OUTPATIENT)
Dept: ORTHOPEDICS | Facility: CLINIC | Age: 43
End: 2023-08-30
Payer: OTHER GOVERNMENT

## 2023-08-30 DIAGNOSIS — G56.01 RIGHT CARPAL TUNNEL SYNDROME: Primary | ICD-10-CM

## 2023-08-30 DIAGNOSIS — G56.21 CUBITAL TUNNEL SYNDROME ON RIGHT: ICD-10-CM

## 2023-08-30 PROCEDURE — 99214 OFFICE O/P EST MOD 30 MIN: CPT | Mod: S$PBB,,, | Performed by: PHYSICIAN ASSISTANT

## 2023-08-30 PROCEDURE — 99214 PR OFFICE/OUTPT VISIT, EST, LEVL IV, 30-39 MIN: ICD-10-PCS | Mod: S$PBB,,, | Performed by: PHYSICIAN ASSISTANT

## 2023-08-30 PROCEDURE — 99213 OFFICE O/P EST LOW 20 MIN: CPT | Mod: PBBFAC,PO | Performed by: PHYSICIAN ASSISTANT

## 2023-08-30 PROCEDURE — 99999 PR PBB SHADOW E&M-EST. PATIENT-LVL III: CPT | Mod: PBBFAC,,, | Performed by: PHYSICIAN ASSISTANT

## 2023-08-30 PROCEDURE — 99999 PR PBB SHADOW E&M-EST. PATIENT-LVL III: ICD-10-PCS | Mod: PBBFAC,,, | Performed by: PHYSICIAN ASSISTANT

## 2023-08-30 RX ORDER — MUPIROCIN 20 MG/G
OINTMENT TOPICAL
Status: CANCELLED | OUTPATIENT
Start: 2023-08-30

## 2023-08-30 NOTE — PROGRESS NOTES
[facility]  History & Physical  Orthopedics    SUBJECTIVE:      COVID-19 attestation:  This patient was treated during the COVID-19 pandemic.  This was discussed with the patient, they are aware of our current policies and procedures, were given the option of delaying their visit and or switching to a virtual visit, delaying their surgery when applicable, and they elect to proceed.    Chief Complaint:  Bilateral hand pain numbness and tingling    Referring Provider: No ref. provider found     History of Present Illness:  Patient is a 43 y.o. ambidextrous male who presents today with complaints of bilateral hand pain numbness and tingling.  The patient notes constant tingling sensations worst to the bilateral thumb index and long fingers.  This has been going on for several years.  He has attempted ibuprofen and nocturnal carpal tunnel bracing with minimal relief.  He presents today for initial evaluation with no other complaints.     Interval history July 20, 2023: The patient returns today for re-evaluation.  Notes that the pain numbness and tingling in his bilateral right greater than left hands persist.  He had a recent EMG and returns for those results and re-evaluation.    Interval History 8/30/23: the patient is seen today to sign consent for right carpal and cubital tunnel release, he is to be scheduled for 11/3/23 at [facility]. Symptoms have remained the same since his previous visit.    The patient is a/an  with the [employer].    Onset of symptoms/DOI was several years ago.    Symptoms are aggravated by activity and movement.    Symptoms are alleviated by nothing    Symptoms consist of pain and numbness/tingling.    The patient rates their pain as a 3/10.    Attempted treatment(s) and/or interventions include activity modifications, rest, anti-inflammatory medications and immobilization.     The patient denies any fevers, chills, N/V, D/C and presents for evaluation.       Past  Medical History:   Diagnosis Date    Ankylosing spondylitis of unspecified sites in spine     Carpal tunnel syndrome on both sides     Tarsal tunnel syndrome, bilateral      Past Surgical History:   Procedure Laterality Date    left knee surgery      RHINOPLASTY      right foot surgery      VASECTOMY       Review of patient's allergies indicates:  No Known Allergies  Social History     Social History Narrative    Not on file     Family History   Problem Relation Age of Onset    Lung cancer Father     Breast cancer Maternal Grandmother     Lung cancer Paternal Grandmother     Lung cancer Paternal Grandfather          Current Outpatient Medications:     albuterol (PROVENTIL/VENTOLIN HFA) 90 mcg/actuation inhaler, Inhale into the lungs., Disp: , Rfl:     amoxicillin-clavulanate 875-125mg (AUGMENTIN) 875-125 mg per tablet, Take 1 tablet by mouth every 12 (twelve) hours. (Patient not taking: Reported on 8/18/2023), Disp: 14 tablet, Rfl: 0    fexofenadine (ALLEGRA ALLERGY) 180 MG tablet, Take 180 mg by mouth., Disp: , Rfl:     fluticasone-salmeterol diskus inhaler 250-50 mcg, Inhale 1 puff into the lungs 2 (two) times daily. Controller, Disp: 60 each, Rfl: 11     mg tablet, Take 800 mg by mouth 3 (three) times daily., Disp: , Rfl:     MULTIVITAMIN ORAL, Take by mouth., Disp: , Rfl:       Review of Systems:  As per HPI otherwise noncontributory    OBJECTIVE:      Vital Signs (Most Recent):  There were no vitals filed for this visit.    There is no height or weight on file to calculate BMI.      Physical Exam:  Constitutional: The patient appears well-developed and well-nourished. No distress.   Skin: No lesions appreciated  Head: Normocephalic and atraumatic.   Nose: Nose normal.   Ears: No deformities seen  Eyes: Conjunctivae and EOM are normal.   Neck: No tracheal deviation present.   Cardiovascular: Normal rate and intact distal pulses.    Pulmonary/Chest: Effort normal. No respiratory distress.   Abdominal:  There is no guarding.   Neurological: The patient is alert.   Psychiatric: The patient has a normal mood and affect.     Bilateral Hand/Wrist Examination:    Observation/Inspection:  Swelling  none    Deformity  none  Discoloration  none     Scars   none    Atrophy  none    HAND/WRIST EXAMINATION:  Finkelstein's Test   Neg  WHAT Test    Neg  Snuff box tenderness   Neg  Barber's Test    Neg  Hook of Hamate Tenderness  Neg  CMC grind    Neg  Circumduction test   Neg    Neurovascular Exam:  Digits WWP, brisk CR < 3s throughout  NVI motor/LTS to M/R/U nerves, radial pulse 2+  Tinel's Test - Carpal Tunnel  positive  Tinel's Test - Cubital Tunnel  positive  Phalen's Test    positive  Median Nerve Compression Test positive    ROM hand full, painless    ROM wrist full, painless    ROM elbow full, painless    Abdomen not guarded  Respirations nonlabored  Perfusion intact    Diagnostic Results:     Imaging - I independently viewed the patient's imaging as well as the radiology report.  Xrays of the patient's bilateral hands  demonstrate no evidence of any acute fractures or dislocations with mild D IP degenerative changes.    EMG   -     ASSESSMENT/PLAN:      43 y.o. yo male with bilateral carpal greater than cubital tunnel syndromes    Plan: The patient and I had a thorough discussion today.  We discussed the working diagnosis as well as several other potential alternative diagnoses.  Treatment options were discussed, both conservative and surgical.  Conservative treatment options would include things such as activity modifications, workplace modifications, a period of rest, oral vs topical OTC and prescription anti-inflammatory medications, occupational therapy, splinting/bracing, immobilization, corticosteroid injections, and others.  Surgical options were discussed as well.     At this time, he would like to proceed with surgery. He is consented for Right endoscopic vs open carpal tunnel release and ulnar nerve  decompression at the elbow with Dr. Perez on 11/3/23. Case ordered for [facility]. We will plan to consent for the left side procedure at his postop appointment. We also discuss the issue of his cervical radiculopathy, he has been referred to Spine for further evaluation.    The patient has not responded to adequate non operative treatment at this time and/or non operative treatment is not indicated. Thus, the risks, benefits and alternatives to surgery were discussed with the patient in detail.  Specific risks include but are not limited to bleeding, infection, vessel and/or nerve damage, pain, numbness, tingling, complex regional pain syndrome, compartment syndrome, failure to return to pre-injury and/or preoperative functional status, scar sensitivity, delayed healing, inability to return to work, pulley injury, tendon injury, bowstringing, partial and/or incomplete relief of symptoms, weakness, persistence of and/or worsening of symptoms, surgical failure, osteomyelitis, amputation, loss of function, stiffness, functional debility, dysfunction, decreased  strength, need for prolonged postoperative rehabilitation, need for further surgery, deep venous thrombosis, pulmonary embolism, arthritis and death.  The patient states an understanding and wishes to proceed with surgery.   All questions were answered.  No guarantees were implied or stated.  Written informed consent was obtained.      Should the patient's symptoms worsen, persist, or fail to improve they should return for reevaluation and I would be happy to see them back anytime.      Jamie Russo-DiGeorge PA-C  [facility]

## 2023-09-01 ENCOUNTER — HOSPITAL ENCOUNTER (OUTPATIENT)
Dept: RADIOLOGY | Facility: HOSPITAL | Age: 43
Discharge: HOME | End: 2023-09-01
Attending: PHYSICAL MEDICINE & REHABILITATION
Payer: OTHER GOVERNMENT

## 2023-09-01 DIAGNOSIS — M54.12 CERVICAL RADICULOPATHY: ICD-10-CM

## 2023-09-01 DIAGNOSIS — M54.2 CERVICALGIA: ICD-10-CM

## 2023-09-01 PROCEDURE — 72141 MRI NECK SPINE W/O DYE: CPT | Mod: 26,,, | Performed by: RADIOLOGY

## 2023-09-01 PROCEDURE — 72141 MRI NECK SPINE W/O DYE: CPT | Mod: TC

## 2023-09-01 PROCEDURE — 72141 MRI CERVICAL SPINE WITHOUT CONTRAST: ICD-10-PCS | Mod: 26,,, | Performed by: RADIOLOGY

## 2023-09-08 ENCOUNTER — OFFICE VISIT (OUTPATIENT)
Dept: SPINE | Facility: CLINIC | Age: 43
End: 2023-09-08
Payer: OTHER GOVERNMENT

## 2023-09-08 ENCOUNTER — TELEPHONE (OUTPATIENT)
Dept: PAIN MEDICINE | Facility: CLINIC | Age: 43
End: 2023-09-08
Payer: OTHER GOVERNMENT

## 2023-09-08 VITALS — WEIGHT: 241.63 LBS | HEIGHT: 73 IN | BODY MASS INDEX: 32.02 KG/M2

## 2023-09-08 DIAGNOSIS — M54.12 CERVICAL RADICULOPATHY: Primary | ICD-10-CM

## 2023-09-08 DIAGNOSIS — E04.1 THYROID NODULE: ICD-10-CM

## 2023-09-08 DIAGNOSIS — M54.2 CERVICALGIA: ICD-10-CM

## 2023-09-08 PROCEDURE — 99213 OFFICE O/P EST LOW 20 MIN: CPT | Mod: S$GLB,,, | Performed by: PHYSICAL MEDICINE & REHABILITATION

## 2023-09-08 PROCEDURE — 99213 PR OFFICE/OUTPT VISIT, EST, LEVL III, 20-29 MIN: ICD-10-PCS | Mod: S$GLB,,, | Performed by: PHYSICAL MEDICINE & REHABILITATION

## 2023-09-08 NOTE — PROGRESS NOTES
SUBJECTIVE:    Patient ID: Jinny Rosado is a 43 y.o. male.    Chief Complaint: No chief complaint on file.    He is here to review his cervical MRI done 09/01/2023 to evaluate his complaint of numbness and tingling radiating down the right arm to digits 1 through 3 of the right hand and EMG showing mild chronic right C7 radiculopathy as well as clinical finding of mild right triceps weakness.    The MRI is summarized below:       FINDINGS:  Morphology: Incidental C7 vertebral body hemangioma.  Marrow signal is otherwise within normal limits and vertebral body heights are preserved.     Alignment: Straightening of the expected normal cervical lordosis.  No spondylolisthesis..     Cord: The cervical cord shows normal contour and signal content throughout its length.     Craniocervical Junction: Cerebellar tonsils are normally positioned. The visualized portions of the posterior fossa are unremarkable. The regional osseous anatomy is normal.        Disc levels:        C2-C3: Mild left-sided facet arthrosis.  The spinal canal and foramina are patent..     C3-C4: Mild bilateral facet arthrosis contributing to no more than mild bilateral foraminal narrowing.  The spinal canal is patent..     C4-C5: Shallow uncovertebral spurring and facet arthrosis produces moderate left and minimal right foraminal narrowing.  The spinal canal remains patent..     C5-C6: Moderate disc height loss and shallow disc osteophyte complex flattening the ventral thecal sac mildly narrowing the spinal canal.  Uncovertebral spurring and mild facet arthrosis produces severe right and moderate left foraminal narrowing..     C6-C7: Mild disc height loss and shallow disc osteophyte complex flattening the ventral thecal sac minimally narrowing the spinal canal.  Uncovertebral spurring and facet arthrosis produces no more than mild bilateral foraminal narrowing..     C7-T1: Mild bilateral facet arthrosis.  Otherwise within normal limits.  The  "spinal canal and foramina are patent..     Soft tissues: Bilateral thyroid nodules measuring up to 1.5 cm on the right.        Impression:     1. Degenerative changes most pronounced at C5-C6 with disc height loss and shallow disc osteophyte complex contributing to mild narrowing of the spinal canal and severe right/moderate left foraminal narrowing.  2. Moderate left foraminal narrowing at C4-C5.  3. Incidentally noted thyroid nodules measuring up to 1.5 cm on the right for which routine thyroid ultrasound is recommended for further characterization if not already performed.      Clinically he is about the same.  His symptoms are described above.  Pain level is 4/10 in general but occasionally up to 6/10 and interferes with his quality of life in terms of recreation and occupation.  He has no new or progressive problems          Past Medical History:   Diagnosis Date    Ankylosing spondylitis of unspecified sites in spine     Carpal tunnel syndrome on both sides     Tarsal tunnel syndrome, bilateral      Social History     Socioeconomic History    Marital status: Unknown   Tobacco Use    Smoking status: Former     Current packs/day: 0.00     Types: Cigarettes     Quit date: 2008     Years since quitting: 15.6    Smokeless tobacco: Never     Past Surgical History:   Procedure Laterality Date    left knee surgery      RHINOPLASTY      right foot surgery      VASECTOMY       Family History   Problem Relation Age of Onset    Lung cancer Father     Breast cancer Maternal Grandmother     Lung cancer Paternal Grandmother     Lung cancer Paternal Grandfather      Vitals:    09/08/23 0956   Weight: 109.6 kg (241 lb 10 oz)   Height: 6' 1" (1.854 m)       Review of Systems   Constitutional:  Negative for chills, diaphoresis, fatigue, fever and unexpected weight change.   HENT:  Negative for trouble swallowing.    Eyes:  Negative for visual disturbance.   Respiratory:  Negative for shortness of breath.    Cardiovascular:  " Negative for chest pain.   Gastrointestinal:  Negative for abdominal pain, constipation, diarrhea, nausea and vomiting.   Genitourinary:  Negative for difficulty urinating.   Musculoskeletal:  Negative for arthralgias, back pain, gait problem, joint swelling, myalgias, neck pain and neck stiffness.   Neurological:  Negative for dizziness, speech difficulty, weakness, light-headedness, numbness and headaches.          Objective:      Physical Exam  Neurological:      Mental Status: He is alert and oriented to person, place, and time.             Assessment:       1. Cervical radiculopathy    2. Cervicalgia    3. Thyroid nodule           Plan:     I reassured him he has no worrisome findings on his MRI.  He told him that the MRI findings are not entirely consistent with the EMG and nerve conduction results but I do think the foraminal narrowing at C5-6 may be contributing to the right arm weakness and numbness and tingling radiating down the right arm.  For symptom relief I am recommending interlaminar injections at C7-T1 and physical therapy.  He is interested in consultation with Neurosurgery which we will arrange.  He needs a ultrasound of the thyroid.  I will defer to primary care for ongoing management after the ultrasound.  He can follow up with me after the procedure      Cervical radiculopathy  -     Ambulatory referral/consult to Neurosurgery; Future; Expected date: 09/15/2023  -     Ambulatory referral/consult to Physical/Occupational Therapy; Future; Expected date: 09/15/2023    Cervicalgia  -     Ambulatory referral/consult to Neurosurgery; Future; Expected date: 09/15/2023  -     Ambulatory referral/consult to Physical/Occupational Therapy; Future; Expected date: 09/15/2023    Thyroid nodule  -     US Soft Tissue Head Neck Thyroid; Future; Expected date: 09/08/2023

## 2023-09-08 NOTE — TELEPHONE ENCOUNTER
----- Message from Álvaro Loo MD sent at 9/8/2023 12:35 PM CDT -----  Please schedule for interlaminar injection C7-T1

## 2023-09-11 NOTE — TELEPHONE ENCOUNTER
Nephrology Consult Note                                                                                                                                                                                                                                                                                                                                                               Office : [phone]     Fax :[phone]              Patient's Name: Chang Salmeron    Reason for Consult:  ESRD   Requesting Physician:  Hoda Gray      [facility] [place] placement in am   PD cath removal per sx       Past Medical History:   Diagnosis Date    Acute respiratory failure with hypoxia ([facility] 2021    [facility]    Anemia     Arthritis     CHF (congestive heart failure) ([facility] [address]     states has home oxygen but does not use    Diabetes mellitus ([facility] [address] [facility]     End stage renal disease ([facility] [address] [facility]     Hemodialysis patient ([facility] [address]     MWF    History of blood transfusion     Hyperlipidemia     Hypertension     Migraine     On home O2     but does not use it    JENA (obstructive sleep apnea)     currently not on cpap       Past Surgical History:   Procedure Laterality Date    CATARACT REMOVAL Bilateral      SECTION      DIALYSIS CATHETER INSERTION N/A 2021    LAPAROSCOPIC PERITONEAL DIALYSIS CATHETER INSERTION, OMENTOPLEXY performed by Yessi Tristan DO at [facility] N/A 2022    LAPAROSCOPIC PERITONEAL DIALYSIS CATHETER PLACEMENT performed by Yessi Tristan DO at [address] N/A 2021    CATHETER REMOVAL PERITONEAL DIALYSIS performed by Yessi Tristan DO at [address]  Bilateral 1988    muscle surgery     IR TUNNELED CATHETER PLACEMENT GREATER THAN 5 YEARS  11/15/2021    IR TUNNELED CATHETER PLACEMENT GREATER THAN 5 YEARS 11/15/2021 [facility]    TOE AMPUTATION Left     left great toe partial amputation    US ASP ABSCESS/HEMATOMA/BULLA/CYST  3/28/2022 Ok to schedule  US ASP ABSCESS/HEMATOMA/BULLA/CYST 3/28/2022 [facility] ULTRASOUND       History reviewed. No pertinent family history. reports that she has never smoked. She has never used smokeless tobacco. She reports previous alcohol use. She reports previous drug use. Allergies:  Patient has no known allergies.     Current Medications:    fluticasone (FLONASE) 50 MCG/ACT nasal spray 1 spray, Daily  gabapentin (NEURONTIN) capsule 300 mg, BID  [Held by provider] amLODIPine (NORVASC) tablet 10 mg, Nightly  b complex-C-folic acid (NEPHROCAPS) capsule 1 mg, Daily  butalbital-acetaminophen-caffeine (FIORICET, ESGIC) per tablet 1 tablet, Q4H PRN  calcium acetate (PHOSLO) capsule 667 mg, TID  [Held by provider] carvedilol (COREG) tablet 12.5 mg, BID  [Held by provider] losartan (COZAAR) tablet 100 mg, Nightly  cefepime (MAXIPIME) 1000 mg IVPB minibag, Q24H  insulin lispro (1 Unit Dial) 0-12 Units, TID WC  insulin lispro (1 Unit Dial) 0-6 Units, Nightly  sodium chloride flush 0.9 % injection 5-40 mL, 2 times per day  sodium chloride flush 0.9 % injection 5-40 mL, PRN  0.9 % sodium chloride infusion, PRN  ondansetron (ZOFRAN-ODT) disintegrating tablet 4 mg, Q8H PRN   Or  ondansetron (ZOFRAN) injection 4 mg, Q6H PRN  polyethylene glycol (GLYCOLAX) packet 17 g, Daily PRN  acetaminophen (TYLENOL) tablet 650 mg, Q6H PRN   Or  acetaminophen (TYLENOL) suppository 650 mg, Q6H PRN  heparin (porcine) injection 5,000 Units, 3 times per day  oxyCODONE (ROXICODONE) immediate release tablet 2.5 mg, Q4H PRN  vancomycin (VANCOCIN) intermittent dosing (placeholder), RX Placeholder  diclofenac sodium (VOLTAREN) 1 % gel 2 g, 4x Daily PRN  rosuvastatin (CRESTOR) tablet 10 mg, Nightly  gentamicin (GARAMYCIN) 0.1 % ointment, Daily  tiZANidine (ZANAFLEX) tablet 2 mg, TID PRN  glucose chewable tablet 16 g, PRN  dextrose bolus 10% 125 mL, PRN   Or  dextrose bolus 10% 250 mL, PRN  glucagon (rDNA) injection 1 mg, PRN  dextrose 5 % solution, PRN          Physical exam:     Vitals:  /76   Pulse 79   Temp 98 °F (36.7 °C) (Oral)   Resp 16   Ht 5' 1\" (1.549 m)   Wt 203 lb 4.2 oz (92.2 kg)   SpO2 98%   BMI 38.41 kg/m²   Constitutional:  OAA X3 NAD  Skin: no rash, turgor wnl  Heent:  eomi, mmm  Neck: no bruits or jvd noted  Cardiovascular:  S1, S2 without m/r/g  Respiratory: CTA B without w/r/r  Abdomen:  +bs, soft, nt, nd  Ext: + lower extremity edema  Psychiatric: mood and affect appropriate  Musculoskeletal:  Rom, muscular strength intact    Data:   Labs:  CBC:   Recent Labs     06/24/22  1441 06/25/22  0737 06/26/22  0759   WBC 6.5 5.1 5.4   HGB 10.9* 10.2* 10.2*    204 221     BMP:    Recent Labs     06/24/22  1525 06/25/22  0737 06/26/22  0759   * 132* 130*   K 3.8 3.6 3.3*   CL 92* 94* 92*   CO2 24 22 25   BUN 61* 57* 52*   CREATININE 9.2* 8.4* 7.8*   GLUCOSE 123* 189* 190*     Ca/Mg/Phos:   Recent Labs     06/24/22  1525 06/25/22  0737 06/26/22  0759   CALCIUM 8.5 8.1* 8.2*   PHOS  --  5.8* 5.2*     Hepatic:   Recent Labs     06/24/22  1525   AST 11*   ALT 14   BILITOT <0.2   ALKPHOS 116     Troponin: No results for input(s): TROPONINI in the last 72 hours. BNP: No results for input(s): BNP in the last 72 hours. Lipids: No results for input(s): CHOL, TRIG, HDL, LDLCALC, LABVLDL in the last 72 hours. ABGs: No results for input(s): PHART, PO2ART, GER0LZL in the last 72 hours. INR:   Recent Labs     06/24/22  1506   INR 1.12     UA:No results for input(s): Aiyana Bumpers, GLUCOSEU, BILIRUBINUR, KETUA, SPECGRAV, BLOODU, PHUR, PROTEINU, UROBILINOGEN, NITRU, LEUKOCYTESUR, Juanell Constant in the last 72 hours. Urine Microscopic: No results for input(s): LABCAST, BACTERIA, COMU, HYALCAST, WBCUA, RBCUA, EPIU in the last 72 hours. Urine Culture: No results for input(s): LABURIN in the last 72 hours. Urine Chemistry: No results for input(s): Benedicto Sailors, PROTEINUR, NAUR in the last 72 hours.           IMAGING:  XR CERVICAL SPINE (2-3 VIEWS)   Final Result   1. Moderate cervical spondylosis as detailed above. XR CHEST PORTABLE   Final Result   Impression:       Hazy right lung opacities which could represent atypical pneumonia in appropriate clinical setting or mild volume overload edema. IR TUNNELED CVC PLACE WO SQ PORT/PUMP > 5 YEARS    (Results Pending)   VL DUP CAROTID BILATERAL    (Results Pending)       Assessment/Plan   1. ESRD     2. HTN    3. Anemia    4. Acid- base/ Electrolyte imbalance     5.  Abd pain     Plan   - pt has rec PD peritonitis - cell count nl now   - change to HD   - Cont CCPD for now   - Sx consult for PD cath removal   - Anemia Management   - MBD management                 Thank you for allowing us to participate in care of Katherine Vincent MD  Feel free to contact me   [facility] [address]  Office : [phone]  Fax :[phone]

## 2023-09-13 ENCOUNTER — HOSPITAL ENCOUNTER (OUTPATIENT)
Dept: RADIOLOGY | Facility: HOSPITAL | Age: 43
Discharge: HOME | End: 2023-09-13
Attending: PHYSICAL MEDICINE & REHABILITATION
Payer: OTHER GOVERNMENT

## 2023-09-13 DIAGNOSIS — E04.1 THYROID NODULE: ICD-10-CM

## 2023-09-13 PROCEDURE — 76536 US EXAM OF HEAD AND NECK: CPT | Mod: TC

## 2023-09-13 PROCEDURE — 76536 US SOFT TISSUE HEAD NECK THYROID: ICD-10-PCS | Mod: 26,,, | Performed by: RADIOLOGY

## 2023-09-13 PROCEDURE — 76536 US EXAM OF HEAD AND NECK: CPT | Mod: 26,,, | Performed by: RADIOLOGY

## 2023-09-14 ENCOUNTER — TELEPHONE (OUTPATIENT)
Dept: SPINE | Facility: CLINIC | Age: 43
End: 2023-09-14
Payer: OTHER GOVERNMENT

## 2023-09-14 ENCOUNTER — TELEPHONE (OUTPATIENT)
Dept: ENDOCRINOLOGY | Facility: CLINIC | Age: 43
End: 2023-09-14
Payer: OTHER GOVERNMENT

## 2023-09-14 DIAGNOSIS — E04.1 THYROID NODULE: Primary | ICD-10-CM

## 2023-09-14 NOTE — TELEPHONE ENCOUNTER
Pt advised per Dr. Loo This man has a nodule on his thyroid that needs further evaluation.  I place a referral to endocrinology.  We need to message endocrinology staff to get him in as soon as possible. Pt is scheduled with ENT and Endocrinologist.

## 2023-09-20 ENCOUNTER — CLINICAL SUPPORT (OUTPATIENT)
Dept: REHABILITATION | Facility: HOSPITAL | Age: 43
End: 2023-09-20
Payer: OTHER GOVERNMENT

## 2023-09-20 DIAGNOSIS — M54.12 CERVICAL RADICULOPATHY: ICD-10-CM

## 2023-09-20 DIAGNOSIS — M54.2 CERVICALGIA: ICD-10-CM

## 2023-09-20 PROCEDURE — 97110 THERAPEUTIC EXERCISES: CPT | Mod: PN

## 2023-09-20 PROCEDURE — 97161 PT EVAL LOW COMPLEX 20 MIN: CPT | Mod: PN

## 2023-09-20 NOTE — PLAN OF CARE
[facility]   Physical Therapy Initial Evaluation      Name: Jinny Rosado  Clinic Number: [id number]    Therapy Diagnosis:   Encounter Diagnoses   Name Primary?    Cervical radiculopathy     Cervicalgia         Physician: Álvaro Loo MD    Physician Orders: PT Eval and Treat   Medical Diagnosis from Referral: Cervical radiculopathy.  Cervicalgia.  Evaluation Date: 9/20/2023  Authorization Period Expiration: 9/7/24  Plan of Care Expiration: 11/17/23  Progress Note Due: 10/20/23  Date of Surgery: NA  Visit # / Visits authorized: 1/ 1   FOTO: 1/ 3;57/100    Precautions: Standard     Time In: 0705  Time Out: 0750  Total Billable Time: 45 minutes    Subjective     Date of onset: insidious,chronic problem.    History of current condition - Jinny reports: neck pain started ~ 10 yrs ago without trauma,gradually getting wors.Pt reports difficulties with ADLs,functional activities,homemaking.  Pt now referred toOP PT.  Falls: no    Imaging: See EPIC    Prior Therapy: No  Social History: in [address] house lives with their family  Occupation: Office ,[employer]  Prior Level of Function: Independent.  Current Level of Function: Modified independent.    Pain:  Current 3/10, worst 8/10, best 2/10   Location: bilateral neck    Description: Aching, Dull, Throbbing, Tingling, Numb, Sharp, and Variable  Aggravating Factors: Bending, Extension, Flexing, and Lifting  Easing Factors: relaxation, pain medication, and rest    Patients goals: pain relief     Medical History:   Past Medical History:   Diagnosis Date    Ankylosing spondylitis of unspecified sites in spine     Carpal tunnel syndrome on both sides     Tarsal tunnel syndrome, bilateral        Surgical History:   Jinny Rosado  has a past surgical history that includes left knee surgery; Vasectomy; right foot surgery; and Rhinoplasty.    Medications:   Jinny has a current medication list which includes the following prescription(s):  albuterol, amoxicillin-clavulanate 875-125mg, fexofenadine, fluticasone-salmeterol 250-50 mcg/dose, ibu, and multivitamin.    Allergies:   Review of patient's allergies indicates:  No Known Allergies     Objective        Cervical/Thoracic/Lumbar AROM: Pain/Dysfunction with Movement:   Flexion  25    Extension  35    Right side bending  30    Left side bending  25    Right rotation  70    Left rotation  60      Strength of c/spine is grossly 3-/5.  Posture;scoliosis,right shoulder elevated.  Special tests;compression test on c/spine;painful  C5 test;neg  ; RT;27, LT;67# pt is right handed.   Palpation C3-C7 paraspinals and upper traps tender, RT>LT.     Intake Outcome Measure for FOTO neck Survey    Therapist reviewed FOTO scores for Jinny Rosado on 9/20/2023.   FOTO report - see Media section or FOTO account episode details.    Intake Score: 43%         Treatment     Total Treatment time (time-based codes) separate from Evaluation: 8 minutes     Jinny received the treatments listed below:      therapeutic exercises to develop ROM and flexibility for 8 minutes including:  OH pulley FLEX3', ABD 3'.  ROM CS LR/RR 10, SBL/SBR 10    Patient Education and Home Exercises     Education provided: Posture ed.  - Role of PT.POC    Assessment     Jinny is a 43 y.o. male referred to outpatient Physical Therapy with a medical diagnosis of cervical radiculopathy. Patient presents with ROM and strength deficits,impaired function due to pain and above listed deficits.    Patient prognosis is Good.   Patient will benefit from skilled outpatient Physical Therapy to address the deficits stated above and in the chart below, provide patient /family education, and to maximize patientt's level of independence.     Plan of care discussed with patient: Yes  Patient's spiritual, cultural and educational needs considered and patient is agreeable to the plan of care and goals as stated below:     Anticipated Barriers for therapy:  NO    Medical Necessity is demonstrated by the following  History  Co-morbidities and personal factors that may impact the plan of care [x] LOW: no personal factors / co-morbidities  [] MODERATE: 1-2 personal factors / co-morbidities  [] HIGH: 3+ personal factors / co-morbidities    Moderate / High Support Documentation:   Co-morbidities affecting plan of care:     Personal Factors:   no deficits     Examination  Body Structures and Functions, activity limitations and participation restrictions that may impact the plan of care [x] LOW: addressing 1-2 elements  [] MODERATE: 3+ elements  [] HIGH: 4+ elements (please support below)    Moderate / High Support Documentation:      Clinical Presentation [x] LOW: stable  [] MODERATE: Evolving  [] HIGH: Unstable     Decision Making/ Complexity Score: low       Goals  SHORT TERM GOALS:  3 weeks  Progress Date met   Recent signs and systems trend is improving in order to progress towards Long term goals.  [] Met  [] Not Met  [] Progressing    Patient will be independent with Home Exercise Program  in order to further progress and return to maximal function. [] Met  [] Not Met  [] Progressing    Pain rating at Worst: 5 /10 in order to progress towards increased independence with activity. [] Met  [] Not Met  [] Progressing    Patient will be able to correct postural deviations in sitting and standing, to decrease pain and promote postural awareness for injury prevention.  [] Met  [] Not Met  [] Progressing    Patient will improve functional outcome (FOTO) score: by 5% to increase self-worth & perceived functional ability towards long term goals [] Met  [] Not Met  [] Progressing      LONG TERM GOALS:  weeks  Progress Date met   Patient will return to normal activites of daily living, recreational, and work related activities with less pain and limitation.  [] Met  [] Not Met  [] Progressing    Patient will improve range of motion  to stated goals in order to return to maximal  functional potential. ROM of c/spine WNL/WFL in all planes. [] Met  [] Not Met  [] Progressing    Patient will improve Strength to stated goals of appropriate musculature in order to improve functional independence. Strength 5/5. [] Met  [] Not Met  [] Progressing    Pain Rating at Best: 1/10 to improve Quality of Life.  [] Met  [] Not Met  [] Progressing    Patient will meet predicted functional outcome (FOTO) score: 30% to increase self-worth & perceived functional ability. [] Met  [] Not Met  [] Progressing    Patient will have met/partially met personal goal of: pain relief. [] Met  [] Not Met  [] Progressing         Plan     Plan of care Certification: 9/20/2023 to 11/17/23.    Outpatient Physical Therapy 2 times weekly for 6 weeks to include the following interventions: Cervical/Lumbar Traction, Electrical Stimulation PRN, Manual Therapy, Moist Heat/ Ice, Patient Education, Therapeutic Activities, Therapeutic Exercise, Ultrasound, and dry needling PRN,IMS PRN. .     Ricco Olmos PT        Physician's Signature: _________________________________________ Date: ________________

## 2023-09-25 ENCOUNTER — CLINICAL SUPPORT (OUTPATIENT)
Dept: REHABILITATION | Facility: HOSPITAL | Age: 43
End: 2023-09-25
Payer: OTHER GOVERNMENT

## 2023-09-25 DIAGNOSIS — M54.2 NECK PAIN: ICD-10-CM

## 2023-09-25 DIAGNOSIS — M54.12 CERVICAL RADICULOPATHY: Primary | ICD-10-CM

## 2023-09-25 DIAGNOSIS — M54.2 CERVICALGIA: ICD-10-CM

## 2023-09-25 PROCEDURE — 97140 MANUAL THERAPY 1/> REGIONS: CPT | Mod: PN,CQ

## 2023-09-25 PROCEDURE — 97530 THERAPEUTIC ACTIVITIES: CPT | Mod: PN,CQ

## 2023-09-25 PROCEDURE — 97012 MECHANICAL TRACTION THERAPY: CPT | Mod: PN,CQ

## 2023-09-25 PROCEDURE — 97112 NEUROMUSCULAR REEDUCATION: CPT | Mod: PN,CQ

## 2023-09-25 NOTE — PROGRESS NOTES
[facility]   Physical Therapy Treatment Note     Name: Jinny [facility] Number: [id number]    Therapy Diagnosis:   Encounter Diagnoses   Name Primary?    Cervical radiculopathy Yes    Cervicalgia     Neck pain      Physician: Álvaro Loo MD    Visit Date: 9/25/2023    Physician Orders: PT Eval and Treat   Medical Diagnosis from Referral: Cervical radiculopathy.  Cervicalgia.  Evaluation Date: 9/20/2023  Authorization Period Expiration: 9/7/24  Plan of Care Expiration: 11/17/23  Progress Note Due: 10/20/23  Date of Surgery: NA  Visit # / Visits authorized: 1/ 15  ([id number])  FOTO: 1/ 3;57/100      Precautions: Standard     Time In: 1000  Time Out: 1058  Total Billable Time: 58 minutes      SUBJECTIVE     Pt reports: had pain upon waking this morning.  He  was not issued HOME EXERCISE PROGRAM at initial evaluation  compliant with home exercise program.  Response to previous treatment: first visit after eval  Functional change: none    Pain: 5/10  Location: bilateral cervical region      OBJECTIVE     Objective Measures updated at progress report unless specified.     Treatment     Jinny received the treatments listed below:      therapeutic exercises to develop strength for 8 minutes including:    UBE 3/3, level 2    Doorway stretch 3 x 30 sec    neuromuscular re-education activities to improve: Sense, Proprioception, and Posture for 10 minutes.    Chin tucks with towel roll against wall x 20  SA rolls with bolster x 20    therapeutic activities to improve functional performance for 17  minutes, including:    Thoracic extension with towel roll x 20  Bilateral External Rotation Green Theraband x 30    Cable column: rows, extension 7# x 30 each  Cable column: adduction 3# x 20 Bilateral     Shoulder shrugs 4# x 30  NOT PERFORMED     Manual therapy: x 8 minutes  Grade II superior/inferior clavicular mobs  First rib mobilization with resisted cervical rotation to  L    Cervical traction 17#/8#, 30/10 x 15 minutes    Patient Education and Home Exercises     Home Exercises Provided and Patient Education Provided     Education provided:   - HOME EXERCISE PROGRAM issued and reviewed, Green Theraband issued, pt verbalized understanding 9/25/23    Written Home Exercises Provided: yes. Exercises were reviewed and Jinny was able to demonstrate them prior to the end of the session.  Jinny demonstrated good  understanding of the education provided. See EMR under Patient Instructions for exercises provided during therapy sessions    ASSESSMENT     Jinny presents with decreased colleen scapular strength, rounded shoulder and forward head posture.  Increased cervical Range of Motion to L after Manual Therapy was performed.  HOME EXERCISE PROGRAM issued for continued strengthening at home.     Jinny Is progressing well towards his goals.   Pt prognosis is Good.     Pt will continue to benefit from skilled outpatient physical therapy to address the deficits listed in the problem list box on initial evaluation, provide pt/family education and to maximize pt's level of independence in the home and community environment.     Pt's spiritual, cultural and educational needs considered and pt agreeable to plan of care and goals.     Anticipated barriers to physical therapy: none    Goals  SHORT TERM GOALS:  3 weeks  Progress  9/25/2023 Date met   Recent signs and systems trend is improving in order to progress towards Long term goals.  [] Met  [] Not Met  [x] Progressing     Patient will be independent with Home Exercise Program  in order to further progress and return to maximal function. [] Met  [] Not Met  [x] Progressing     Pain rating at Worst: 5 /10 in order to progress towards increased independence with activity. [] Met  [] Not Met  [x] Progressing     Patient will be able to correct postural deviations in sitting and standing, to decrease pain and promote postural awareness for injury  prevention.  [] Met  [] Not Met  [x] Progressing     Patient will improve functional outcome (FOTO) score: by 5% to increase self-worth & perceived functional ability towards long term goals [] Met  [] Not Met  [x] Progressing        LONG TERM GOALS:  weeks  Progress  9/25/2023 Date met   Patient will return to normal activites of daily living, recreational, and work related activities with less pain and limitation.  [] Met  [] Not Met  [x] Progressing     Patient will improve range of motion  to stated goals in order to return to maximal functional potential. ROM of c/spine WNL/WFL in all planes. [] Met  [] Not Met  [x] Progressing     Patient will improve Strength to stated goals of appropriate musculature in order to improve functional independence. Strength 5/5. [] Met  [] Not Met  [x] Progressing     Pain Rating at Best: 1/10 to improve Quality of Life.  [] Met  [] Not Met  [x] Progressing     Patient will meet predicted functional outcome (FOTO) score: 30% to increase self-worth & perceived functional ability. [] Met  [] Not Met  [x] Progressing     Patient will have met/partially met personal goal of: pain relief. [] Met  [] Not Met  [x] Progressing        PLAN     Cont per Plan of Care, posture, colleen scapular strengthening    Irene Michel, PTA

## 2023-09-28 ENCOUNTER — DOCUMENTATION ONLY (OUTPATIENT)
Dept: REHABILITATION | Facility: HOSPITAL | Age: 43
End: 2023-09-28
Payer: OTHER GOVERNMENT

## 2023-09-28 ENCOUNTER — PATIENT MESSAGE (OUTPATIENT)
Dept: SPINE | Facility: CLINIC | Age: 43
End: 2023-09-28
Payer: OTHER GOVERNMENT

## 2023-09-28 NOTE — PROGRESS NOTES
PT/PTA met face to face to discuss pt's treatment plan and progress towards established goals. Pt will be seen by a physical therapist minimally every 6th visit or every 30 days.    Irene Michel PTA

## 2023-10-05 ENCOUNTER — PATIENT MESSAGE (OUTPATIENT)
Dept: SPINE | Facility: CLINIC | Age: 43
End: 2023-10-05
Payer: OTHER GOVERNMENT

## 2023-10-11 ENCOUNTER — TELEPHONE (OUTPATIENT)
Dept: PAIN MEDICINE | Facility: CLINIC | Age: 43
End: 2023-10-11
Payer: OTHER GOVERNMENT

## 2023-10-11 NOTE — TELEPHONE ENCOUNTER
----- Message from Mariah Barger sent at 10/11/2023  8:12 AM CDT -----  Contact: Patient  Type: Needs Medical Advice    Who Called:  Patient  What is this regarding?:  He needs a general idea of th e time fo the day of his injection bc he was looking to also schedule an apt for his daughter that same day.  Best Call Back Number:  [phone]  Additional Information:  Please call the patient back at the phone number listed above to advise. Thank you!

## 2023-10-12 ENCOUNTER — OFFICE VISIT (OUTPATIENT)
Dept: NEUROSURGERY | Facility: CLINIC | Age: 43
End: 2023-10-12
Payer: OTHER GOVERNMENT

## 2023-10-12 VITALS
SYSTOLIC BLOOD PRESSURE: 138 MMHG | HEIGHT: 73 IN | BODY MASS INDEX: 31.96 KG/M2 | HEART RATE: 122 BPM | WEIGHT: 241.19 LBS | DIASTOLIC BLOOD PRESSURE: 97 MMHG

## 2023-10-12 DIAGNOSIS — M54.12 CERVICAL RADICULOPATHY: ICD-10-CM

## 2023-10-12 DIAGNOSIS — M54.2 CERVICALGIA: ICD-10-CM

## 2023-10-12 DIAGNOSIS — M50.20 CERVICAL DISC DISPLACEMENT: ICD-10-CM

## 2023-10-12 DIAGNOSIS — G56.03 BILATERAL CARPAL TUNNEL SYNDROME: ICD-10-CM

## 2023-10-12 DIAGNOSIS — M50.30 DDD (DEGENERATIVE DISC DISEASE), CERVICAL: Primary | ICD-10-CM

## 2023-10-12 PROCEDURE — 99205 OFFICE O/P NEW HI 60 MIN: CPT | Mod: S$PBB,,, | Performed by: NEUROLOGICAL SURGERY

## 2023-10-12 PROCEDURE — 99205 PR OFFICE/OUTPT VISIT, NEW, LEVL V, 60-74 MIN: ICD-10-PCS | Mod: S$PBB,,, | Performed by: NEUROLOGICAL SURGERY

## 2023-10-12 NOTE — PROGRESS NOTES
I appreciate this referral from Dr. Loo    HPI:  This is a very pleasant 43-year-old gentleman presents with posterior neck pain with radiation to the right shoulder, arm,.  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  Consequently he reports that he is had off and on neck pain for the past 20 years.  However, over the last several months the neck and right arm pain have progressively worsened.  He describes constant, dull aching pain in the lower posterior cervical region with radiation into the right shoulder, anterior lateral arm, diffusely in the right forearm and hand.  He reports diffuse bilateral hand numbness, right-greater-than-left.  He reports subjective right arm weakness.  He denies dropping objects.  He denies hand incoordination, imbalance, sphincter dysfunction.  EMG nerve conduction study July 6, 2023 was reviewed.  There is evidence moderate right median neuropathy, mild left median neuropathy, mild ulnar neuropathy, and mild right C7 radiculopathy.  He is scheduled for a right ulnar and carpal tunnel release with Dr. Perez November 3rd.  He is also scheduled for a C7-T1 RAYSHAWN with Dr. Luna October 13, 2023.  He reports the neck and right arm pain are packing his quality of life.  He is anxious for definitive resolution.      MRI and thyroid ultrasound demonstrated nodules.  He is scheduled for a thyroid biopsy.    Smoking status:  Former, quit 2008  Past Medical History:   Diagnosis Date    Ankylosing spondylitis of unspecified sites in spine     Carpal tunnel syndrome on both sides     Sleep apnea     Tarsal tunnel syndrome, bilateral       Past Surgical History:   Procedure Laterality Date    left knee surgery      RHINOPLASTY      right foot surgery      VASECTOMY       Social History     Tobacco Use    Smoking status: Former     Current packs/day: 0.00     Types: Cigarettes     Quit date: 2008     Years since quitting: 15.7     Smokeless tobacco: Never       Review of Systems: All systems reviewed and are as above or otherwise negative.    General: well developed, well nourished, no distress.   Head: normocephalic, atraumatic  Eyes: pupils equal, round, reactive to light with accomodation, EOMI.   Neck: trachea midline. No JVD  Cardiovascular: No LE edema   Pulmonary: normal respirations, no signs of respiratory distress  Abdomen: soft, non-distended, not tender to palpation  Sensory: intact to light touch throughout  Extremities: No bruising, deformity  Skin: Skin is warm, dry and intact.    Motor Strength: Moves all extremities spontaneously with good tone.  Full strength upper and lower extremities. No abnormal movements seen.     Strength  Deltoids Triceps Biceps Wrist Extension Wrist Flexion Hand    Upper: R 5/5 5/5 5/5 5/5 5/5 5/5    L 5/5 5/5 5/5 5/5 5/5 5/5     Iliopsoas Quadriceps Knee  Flexion Tibialis  anterior Gastro- cnemius EHL   Lower: R 5/5 5/5 5/5 5/5 5/5 5/5    L 5/5 5/5 5/5 5/5 5/5 5/5     Neurologic: Alert and oriented. Thought content appropriate.  GCS: Motor: 6/Verbal: 5/Eyes: 4 GCS Total: 15  Mental Status: Awake, Alert, Oriented x 4  Language: No aphasia  Speech: No dysarthria  Cranial nerves: face symmetric, tongue midline, CN II-XII grossly intact.     Pronator Drift: no drift noted  Finger-to-nose: Intact bilaterally  DTR's: 2 + and symmetric in UE and LE  Krishnamurthy: absent  Clonus: absent  Babinski: absent    Gait: normal    Tandem Gait: No difficulty           Able to walk on heels & toes    Cervical Spine  ROM: full    Nontender to palpation    Lumbar Spine   ROM: full   Nontender to palpation    Pain on Hip ROM: Negative  Straight leg raise: negative bilaterally     SI Joint tenderness: Negative bilaterally   enslen: Negative bilaterally    Significant Exam Findings:  Motor and sensory intact.  Absent Cliff sign.  Tender to palpation posterior cervical region.  Right rotation and extension exacerbate  pain    Significant Radiology Findings:  I personally reviewed MRI cervical spine with the patient and his wife.  Date of study 09/01/2023.  Pertinent findings include multilevel mild-to-moderate degenerative disc degeneration with disc desiccation and mild disc height loss, loss of cervical lordosis, moderate to severe left foraminal stenosis C4-5, severe foraminal stenosis C5-6, no foraminal stenosis C6-7, no significant central stenosis, no cord signal change    Analysis:  The cause of his symptoms is multifactorial including cervical radiculopathy, likely due to the C5-6 severe foraminal stenosis, as well as both ulnar and median neuropathy. He is neurologically intact without myelopathy signs.  I outlined nonoperative and operative treatment options.  He is inclined to proceed with operative management.  I offered ACDF C5-6.  I described the procedure using anatomic model and MRI imaging.  I outlined the goals of surgery, material risks, and anticipated postoperative course.  I pointed out the moderate foraminal stenosis at C4-5 on the left which is presently asymptomatic.  He voiced understanding.   We both agree that proceeding with a thyroid biopsy takes precedence, particularly given his father's history of thyroid cancer.  Proceeding with the ulnar and carpal tunnel release is also reasonable.  I will see him back in 2 months for reassessment and further discussion    Jinny was seen today for neck pain.    Diagnoses and all orders for this visit:    DDD (degenerative disc disease), cervical    Cervical radiculopathy  -     Ambulatory referral/consult to Neurosurgery    Cervicalgia  -     Ambulatory referral/consult to Neurosurgery    Cervical disc displacement    Bilateral carpal tunnel syndrome    I spent a total of 60 minutes on the day of the visit.  This includes face to face time and non-face to face time preparing to see the patient (eg, review of tests), obtaining and/or reviewing separately  obtained history, documenting clinical information in the electronic or other health record, independently interpreting results and communicating results to the patient/family/caregiver, or care coordinator.

## 2023-10-13 ENCOUNTER — HOSPITAL ENCOUNTER (OUTPATIENT)
Facility: HOSPITAL | Age: 43
Discharge: HOME | End: 2023-10-13
Attending: ANESTHESIOLOGY | Admitting: ANESTHESIOLOGY
Payer: OTHER GOVERNMENT

## 2023-10-13 DIAGNOSIS — M54.12 CERVICAL RADICULITIS: ICD-10-CM

## 2023-10-13 PROCEDURE — 62321 PR INJ CERV/THORAC, W/GUIDANCE: ICD-10-PCS | Mod: ,,, | Performed by: ANESTHESIOLOGY

## 2023-10-13 PROCEDURE — 62321 NJX INTERLAMINAR CRV/THRC: CPT | Mod: ,,, | Performed by: ANESTHESIOLOGY

## 2023-10-13 PROCEDURE — A4216 STERILE WATER/SALINE, 10 ML: HCPCS | Performed by: ANESTHESIOLOGY

## 2023-10-13 PROCEDURE — 62321 NJX INTERLAMINAR CRV/THRC: CPT | Performed by: ANESTHESIOLOGY

## 2023-10-13 PROCEDURE — 25000003 PHARM REV CODE 250: Performed by: ANESTHESIOLOGY

## 2023-10-13 PROCEDURE — 63600175 PHARM REV CODE 636 W HCPCS: Performed by: ANESTHESIOLOGY

## 2023-10-13 PROCEDURE — 25500020 PHARM REV CODE 255: Performed by: ANESTHESIOLOGY

## 2023-10-13 RX ORDER — FENTANYL CITRATE 50 UG/ML
INJECTION, SOLUTION INTRAMUSCULAR; INTRAVENOUS
Status: DISCONTINUED | OUTPATIENT
Start: 2023-10-13 | End: 2023-10-13 | Stop reason: HOSPADM

## 2023-10-13 RX ORDER — DEXAMETHASONE SODIUM PHOSPHATE 10 MG/ML
INJECTION INTRAMUSCULAR; INTRAVENOUS
Status: DISCONTINUED | OUTPATIENT
Start: 2023-10-13 | End: 2023-10-13 | Stop reason: HOSPADM

## 2023-10-13 RX ORDER — MIDAZOLAM HYDROCHLORIDE 1 MG/ML
INJECTION INTRAMUSCULAR; INTRAVENOUS
Status: DISCONTINUED | OUTPATIENT
Start: 2023-10-13 | End: 2023-10-13 | Stop reason: HOSPADM

## 2023-10-13 RX ORDER — LIDOCAINE HYDROCHLORIDE 10 MG/ML
INJECTION, SOLUTION EPIDURAL; INFILTRATION; INTRACAUDAL; PERINEURAL
Status: DISCONTINUED | OUTPATIENT
Start: 2023-10-13 | End: 2023-10-13 | Stop reason: HOSPADM

## 2023-10-13 RX ORDER — SODIUM CHLORIDE, SODIUM LACTATE, POTASSIUM CHLORIDE, CALCIUM CHLORIDE 600; 310; 30; 20 MG/100ML; MG/100ML; MG/100ML; MG/100ML
INJECTION, SOLUTION INTRAVENOUS CONTINUOUS
Status: ACTIVE | OUTPATIENT
Start: 2023-10-13

## 2023-10-13 RX ORDER — SODIUM CHLORIDE 9 MG/ML
INJECTION, SOLUTION INTRAMUSCULAR; INTRAVENOUS; SUBCUTANEOUS
Status: DISCONTINUED | OUTPATIENT
Start: 2023-10-13 | End: 2023-10-13 | Stop reason: HOSPADM

## 2023-10-13 RX ORDER — LIDOCAINE HYDROCHLORIDE 10 MG/ML
1 INJECTION, SOLUTION EPIDURAL; INFILTRATION; INTRACAUDAL; PERINEURAL ONCE
Status: COMPLETED | OUTPATIENT
Start: 2023-10-13 | End: 2023-10-13

## 2023-10-13 RX ADMIN — LIDOCAINE HYDROCHLORIDE 10 MG: 10 SOLUTION INTRAVENOUS at 10:10

## 2023-10-13 RX ADMIN — SODIUM CHLORIDE, POTASSIUM CHLORIDE, SODIUM LACTATE AND CALCIUM CHLORIDE: 600; 310; 30; 20 INJECTION, SOLUTION INTRAVENOUS at 10:10

## 2023-10-13 NOTE — DISCHARGE SUMMARY
[facility] - Periop Services  Discharge Note  Short Stay    Procedure(s) (LRB):  Injection-steroid-epidural-cervical (N/A)      OUTCOME: Patient tolerated treatment/procedure well without complication and is now ready for discharge.    DISPOSITION: Home or Self Care    FINAL DIAGNOSIS:  <principal problem not specified>    FOLLOWUP: In clinic    DISCHARGE INSTRUCTIONS:    Discharge Procedure Orders   Notify your health care provider if you experience any of the following:  temperature >100.4     Notify your health care provider if you experience any of the following:  severe uncontrolled pain     Notify your health care provider if you experience any of the following:  redness, tenderness, or signs of infection (pain, swelling, redness, odor or green/yellow discharge around incision site)     Activity as tolerated        TIME SPENT ON DISCHARGE: 30 minutes

## 2023-10-13 NOTE — OP NOTE
PROCEDURE DATE: 10/13/2023    Procedure: C7-T1 cervical interlaminar epidural steroid injection under utilizing fluoroscopy.    Diagnosis: Cervical Degenerative Disc Diease; Cervical Radiculitis  POSTOP DIAGNOSIS: SAME    Physician: Arpit Luna MD    Medications injected:  Dexamethasone 10mg followed by a slow injection of 4mL sterile, preservative-free normal saline.    Local anesthetic used: Lidocaine 1%, 10 ml.    Sedation Medications: RN IV sedation    Complications:  None     Estimated blood loss: None    Technique:  A time-out was taken to identify patient and procedure prior to starting the procedure.  With the patient laying in a prone position with the neck in a mid-flexed forward position, the area was prepped and draped in the usual sterile fashion using ChloraPrep and a fenestrated drape.  The area was determined under AP fluoroscopic guidance.  Local anesthetic was given using a 25-gauge 1.5 inch needle by raising a wheal and then infiltrating ventrally.  A 3.5 inch 20-gauge Touhy needle was introduced under fluoroscopic guidance to meet the lamina of C7.  The needle was then hinged under the lamina then advanced using loss of resistance technique.  Once the tip of the needle was in the desired position, the 1ml contrast dye  was injected to determine placement and no uptake.  The steroid was then injected slowly followed by a slow injection of 4 mL of the sterile preservative-free normal saline.  The patient tolerated the procedure well.    The patient was monitored after the procedure and was given post-procedure and discharge instructions to follow at home. The patient was discharged in a stable condition.

## 2023-10-13 NOTE — PLAN OF CARE
Discharge instructions given to pt/wife, verbalized understanding.  Tolerating PO fluids.  IV removed.  Pain 1/10.  Ambulating out to wife  per RN in no distress.

## 2023-10-17 VITALS
SYSTOLIC BLOOD PRESSURE: 129 MMHG | OXYGEN SATURATION: 95 % | DIASTOLIC BLOOD PRESSURE: 75 MMHG | BODY MASS INDEX: 31.94 KG/M2 | TEMPERATURE: 98 F | HEART RATE: 76 BPM | HEIGHT: 73 IN | WEIGHT: 241 LBS | RESPIRATION RATE: 20 BRPM

## 2023-10-18 ENCOUNTER — HOSPITAL ENCOUNTER (OUTPATIENT)
Dept: RADIOLOGY | Facility: HOSPITAL | Age: 43
Discharge: HOME | End: 2023-10-18
Attending: STUDENT IN AN ORGANIZED HEALTH CARE EDUCATION/TRAINING PROGRAM
Payer: OTHER GOVERNMENT

## 2023-10-18 DIAGNOSIS — E04.1 THYROID NODULE GREATER THAN OR EQUAL TO 1.5 CM IN DIAMETER INCIDENTALLY NOTED ON IMAGING STUDY: ICD-10-CM

## 2023-10-18 PROCEDURE — 88173 CYTOPATH EVAL FNA REPORT: CPT | Mod: TC | Performed by: PATHOLOGY

## 2023-10-18 PROCEDURE — 10005 FNA BX W/US GDN 1ST LES: CPT

## 2023-10-18 PROCEDURE — 10005 FNA BX W/US GDN 1ST LES: CPT | Mod: ,,, | Performed by: RADIOLOGY

## 2023-10-18 PROCEDURE — 10005 US FINE NEEDLE ASPIRATION BIOPSY, FIRST LESION: ICD-10-PCS | Mod: ,,, | Performed by: RADIOLOGY

## 2023-10-18 PROCEDURE — 27200940 US FINE NEEDLE ASPIRATION BIOPSY, FIRST LESION

## 2023-10-23 PROBLEM — J18.9 COMMUNITY ACQUIRED PNEUMONIA OF RIGHT LOWER LOBE OF LUNG: Status: RESOLVED | Noted: 2023-07-24 | Resolved: 2023-10-23

## 2023-10-24 ENCOUNTER — OFFICE VISIT (OUTPATIENT)
Dept: PULMONOLOGY | Facility: CLINIC | Age: 43
End: 2023-10-24
Payer: OTHER GOVERNMENT

## 2023-10-24 VITALS
HEART RATE: 90 BPM | HEIGHT: 73 IN | OXYGEN SATURATION: 98 % | SYSTOLIC BLOOD PRESSURE: 137 MMHG | WEIGHT: 243.19 LBS | BODY MASS INDEX: 32.23 KG/M2 | DIASTOLIC BLOOD PRESSURE: 85 MMHG

## 2023-10-24 DIAGNOSIS — R05.3 CHRONIC COUGH: ICD-10-CM

## 2023-10-24 DIAGNOSIS — J45.40 MODERATE PERSISTENT ASTHMA WITHOUT COMPLICATION: Primary | ICD-10-CM

## 2023-10-24 PROCEDURE — 99213 OFFICE O/P EST LOW 20 MIN: CPT | Mod: PBBFAC,PO | Performed by: INTERNAL MEDICINE

## 2023-10-24 PROCEDURE — 99214 OFFICE O/P EST MOD 30 MIN: CPT | Mod: S$PBB,,, | Performed by: INTERNAL MEDICINE

## 2023-10-24 PROCEDURE — 99999 PR PBB SHADOW E&M-EST. PATIENT-LVL III: CPT | Mod: PBBFAC,,, | Performed by: INTERNAL MEDICINE

## 2023-10-24 PROCEDURE — 99999 PR PBB SHADOW E&M-EST. PATIENT-LVL III: ICD-10-PCS | Mod: PBBFAC,,, | Performed by: INTERNAL MEDICINE

## 2023-10-24 PROCEDURE — 99214 PR OFFICE/OUTPT VISIT, EST, LEVL IV, 30-39 MIN: ICD-10-PCS | Mod: S$PBB,,, | Performed by: INTERNAL MEDICINE

## 2023-10-24 RX ORDER — MINERAL OIL
1 ENEMA (ML) RECTAL EVERY MORNING
COMMUNITY
Start: 2023-05-30

## 2023-10-24 RX ORDER — ALBUTEROL SULFATE 90 UG/1
1-2 AEROSOL, METERED RESPIRATORY (INHALATION) EVERY 4 HOURS PRN
Qty: 18 G | Refills: 11 | Status: SHIPPED | OUTPATIENT
Start: 2023-10-24 | End: 2024-10-23

## 2023-10-24 NOTE — PROGRESS NOTES
10/26/2023    Jinny Rosado  Follow up    Chief Complaint   Patient presents with    3m F/U       HPI:  10/24/2023- pt feeling improved since last visit. Still cough, productive unknown color. He notices if he misses a dose of advair. Yesterday w/ exposure to marsh fire he had worsened cough/tightness in chest. Has had recurrent bronchitis/pna for years.  Since last visit has seen PCP with - had nodules in thyroid biopsied and abnormal cells seen, has ENT appt tomorrow. Spouse present and assists w/ history.    07/24/2023-   Stop allegra and start mucinex 1200mg twice a day  Chest x-ray  Sputum sample bring to lab  Augmentin twice daily for 7 days  Advair inhaler take 1 puff twice daily- rinse mouth after use  Probably cough variant asthma but can do further testing after pneumonia is better    Pt is a 44 yo male with productive cough, several years and worsening. For the last month his cough is productive of yellow, brown and green mucous. His spouse is present and assists w history. Cough assoc with wheeze, gurgling. Last wk went to see PCP and was referred for oncology, no further eval or treatments given.  Has tried allegra and albuterol w/ no benefit.  Cough fits with laughing, taking deep breaths.   He wears a CPAP machine (started in 2018). Lately wakes at night coughing.  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 No burn pits.  FH father had lung ca, was a .  Pt has 1/2 ppd smoking hx about 8 yrs, quit 2008.  He sees primary Dr Keith at [facility].  Had pna once in past, in high school.  They just moved here from [place]    The chief complaint problem is stable    PFSH:  Past Medical History:   Diagnosis Date    Ankylosing spondylitis of unspecified sites in spine     Carpal tunnel syndrome on both sides     Sleep apnea     Tarsal tunnel syndrome, bilateral   "        Past Surgical History:   Procedure Laterality Date    EPIDURAL STEROID INJECTION INTO CERVICAL SPINE N/A 10/13/2023    Procedure: Injection-steroid-epidural-cervical;  Surgeon: Arpit Luna MD;  [facility];  Service: Anesthesiology;  Laterality: N/A;  c7-t1    left knee surgery      RHINOPLASTY      right foot surgery      VASECTOMY       Social History     Tobacco Use    Smoking status: Former     Current packs/day: 0.00     Types: Cigarettes     Quit date: 2008     Years since quitting: 15.8    Smokeless tobacco: Never     Family History   Problem Relation Age of Onset    Lung cancer Father     Breast cancer Maternal Grandmother     Lung cancer Paternal Grandmother     Lung cancer Paternal Grandfather      Review of patient's allergies indicates:  No Known Allergies    Performance Status:The patient's activity level is regular exercise.      Review of Systems:  a review of eleven systems covering constitutional, Eye, HEENT, Psych, Respiratory, Cardiac, GI, , Musculoskeletal, Endocrine, Dermatologic was negative except for pertinent findings as listed ABOVE and below:  Some sinus drainage  Wt gain 20#  Feels warm- temp 99  Sweats, always hot  Tightness, burning in chest    Exam:Comprehensive exam done. /85 (BP Location: Left forearm, Patient Position: Sitting, BP Method: Medium (Automatic))   Pulse 90   Ht 6' 1" (1.854 m)   Wt 110.3 kg (243 lb 2.7 oz)   SpO2 98% Comment: on room air at rest  BMI 32.08 kg/m²   Exam included Vitals as listed, and patient's appearance and affect and alertness and mood, oral exam for yeast and hygiene and pharynx lesions and Mallapatti (M) score, neck with inspection for jvd and masses and thyroid abnormalities and lymph nodes (supraclavicular and infraclavicular nodes and axillary also examined and noted if abn), chest exam included symmetry and effort and fremitus and percussion and auscultation, cardiac exam included rhythm and gallops and murmur and " "rubs and jvd and edema, abdominal exam for mass and hepatosplenomegaly and tenderness and hernias and bowel sounds, Musculoskeletal exam with muscle tone and posture and mobility/gait and  strength, and skin for rashes and cyanosis and pallor and turgor, extremity for clubbing.  Findings were normal except for pertinent findings listed below:  Oropharynx clear, M1  HR regular  Breath sounds with scant rales L lower lung, dense crackles RLL. No wheezes. Coughs with deep inspiration  No edema/clubbing      Radiographs (ct chest and cxr) reviewed: results reviewed - ordered and will review    Labs none available   No results found for: "WBC", "HGB", "HCT", "MCV", "PLT"    CMP  No results found for: "NA", "K", "CL", "CO2", "GLU", "BUN", "CREATININE", "CALCIUM", "PROT", "ALBUMIN", "BILITOT", "ALKPHOS", "AST", "ALT", "ANIONGAP", "EGFRNORACEVR"      PFT will be done and results to be reviewed  Order next visit      Plan:  Clinical impression is reasonably certain and repeated evaluation prn +/- follow up will be needed as below. Asthma ongoing symptoms, treated for pna last visit and improved    Problem List Items Addressed This Visit          Pulmonary    Moderate persistent asthma without complication - Primary    Relevant Medications    albuterol (PROVENTIL/VENTOLIN HFA) 90 mcg/actuation inhaler    Other Relevant Orders    CBC Auto Differential     Other Visit Diagnoses       Chronic cough        Relevant Orders    Complete PFT with bronchodilator    CT Chest High Resolution Without Contrast              Follow up in about 3 months (around 1/24/2024).    Discussed with patient above for education the following:      Patient Instructions   CT chest   Pulmonary function tests  Go to lab for blood test  Add albuterol rescue inhaler as needed  Continue advair    "

## 2023-10-26 NOTE — PATIENT INSTRUCTIONS
CT chest   Pulmonary function tests  Go to lab for blood test  Add albuterol rescue inhaler as needed  Continue advair

## 2023-10-27 ENCOUNTER — ANESTHESIA EVENT (OUTPATIENT)
Dept: SURGERY | Facility: HOSPITAL | Age: 43
End: 2023-10-27
Payer: OTHER GOVERNMENT

## 2023-10-27 ENCOUNTER — PATIENT MESSAGE (OUTPATIENT)
Dept: PULMONOLOGY | Facility: CLINIC | Age: 43
End: 2023-10-27
Payer: OTHER GOVERNMENT

## 2023-10-27 ENCOUNTER — LAB VISIT (OUTPATIENT)
Dept: LAB | Facility: HOSPITAL | Age: 43
End: 2023-10-27
Attending: INTERNAL MEDICINE
Payer: OTHER GOVERNMENT

## 2023-10-27 ENCOUNTER — PATIENT MESSAGE (OUTPATIENT)
Dept: SURGERY | Facility: HOSPITAL | Age: 43
End: 2023-10-27
Payer: OTHER GOVERNMENT

## 2023-10-27 DIAGNOSIS — J45.40 MODERATE PERSISTENT ASTHMA WITHOUT COMPLICATION: ICD-10-CM

## 2023-10-27 LAB
BASOPHILS # BLD AUTO: 0.02 K/UL (ref 0–0.2)
BASOPHILS NFR BLD: 0.3 % (ref 0–1.9)
DIFFERENTIAL METHOD: ABNORMAL
EOSINOPHIL # BLD AUTO: 0.1 K/UL (ref 0–0.5)
EOSINOPHIL NFR BLD: 1.1 % (ref 0–8)
ERYTHROCYTE [DISTWIDTH] IN BLOOD BY AUTOMATED COUNT: 12.2 % (ref 11.5–14.5)
HCT VFR BLD AUTO: 45.9 % (ref 40–54)
HGB BLD-MCNC: 15.1 G/DL (ref 14–18)
IMM GRANULOCYTES # BLD AUTO: 0.05 K/UL (ref 0–0.04)
IMM GRANULOCYTES NFR BLD AUTO: 0.7 % (ref 0–0.5)
LYMPHOCYTES # BLD AUTO: 1.6 K/UL (ref 1–4.8)
LYMPHOCYTES NFR BLD: 22.1 % (ref 18–48)
MCH RBC QN AUTO: 29.9 PG (ref 27–31)
MCHC RBC AUTO-ENTMCNC: 32.9 G/DL (ref 32–36)
MCV RBC AUTO: 91 FL (ref 82–98)
MONOCYTES # BLD AUTO: 0.6 K/UL (ref 0.3–1)
MONOCYTES NFR BLD: 8.1 % (ref 4–15)
NEUTROPHILS # BLD AUTO: 4.8 K/UL (ref 1.8–7.7)
NEUTROPHILS NFR BLD: 67.7 % (ref 38–73)
NRBC BLD-RTO: 0 /100 WBC
PLATELET # BLD AUTO: 226 K/UL (ref 150–450)
PMV BLD AUTO: 8.6 FL (ref 9.2–12.9)
RBC # BLD AUTO: 5.05 M/UL (ref 4.6–6.2)
WBC # BLD AUTO: 7.07 K/UL (ref 3.9–12.7)

## 2023-10-27 PROCEDURE — 85025 COMPLETE CBC W/AUTO DIFF WBC: CPT | Performed by: INTERNAL MEDICINE

## 2023-10-27 PROCEDURE — 36415 COLL VENOUS BLD VENIPUNCTURE: CPT | Performed by: INTERNAL MEDICINE

## 2023-10-30 ENCOUNTER — HOSPITAL ENCOUNTER (OUTPATIENT)
Dept: RADIOLOGY | Facility: HOSPITAL | Age: 43
Discharge: HOME | End: 2023-10-30
Attending: INTERNAL MEDICINE
Payer: OTHER GOVERNMENT

## 2023-10-30 ENCOUNTER — HOSPITAL ENCOUNTER (OUTPATIENT)
Dept: PULMONOLOGY | Facility: HOSPITAL | Age: 43
Discharge: HOME | End: 2023-10-30
Attending: INTERNAL MEDICINE
Payer: OTHER GOVERNMENT

## 2023-10-30 DIAGNOSIS — R05.3 CHRONIC COUGH: ICD-10-CM

## 2023-10-30 LAB
DLCO SINGLE BREATH LLN: 28.1
DLCO SINGLE BREATH PRE REF: 99.4 %
DLCO SINGLE BREATH REF: 35.03
DLCOC SBVA LLN: 3.41
DLCOC SBVA REF: 4.53
DLCOC SINGLE BREATH LLN: 28.1
DLCOC SINGLE BREATH REF: 35.03
DLCOVA LLN: 3.41
DLCOVA PRE REF: 109.5 %
DLCOVA PRE: 4.96 ML/(MIN*MMHG*L) (ref 3.41–5.65)
DLCOVA REF: 4.53
ERV LLN: -16448.51
ERV PRE REF: 66.5 %
ERV REF: 1.49
FEF 25 75 CHG: -2.7 %
FEF 25 75 LLN: 2.39
FEF 25 75 POST REF: 119.9 %
FEF 25 75 PRE REF: 123.2 %
FEF 25 75 REF: 4.2
FET100 CHG: 11.1 %
FEV1 CHG: -1.3 %
FEV1 FVC CHG: -0.6 %
FEV1 FVC LLN: 69
FEV1 FVC POST REF: 102.9 %
FEV1 FVC PRE REF: 103.5 %
FEV1 FVC REF: 80
FEV1 LLN: 3.57
FEV1 POST REF: 107.4 %
FEV1 PRE REF: 108.8 %
FEV1 REF: 4.52
FRCPLETH LLN: 2.65
FRCPLETH PREREF: 80 %
FRCPLETH REF: 3.64
FVC CHG: -0.7 %
FVC LLN: 4.52
FVC POST REF: 103.7 %
FVC PRE REF: 104.4 %
FVC REF: 5.71
IVC PRE: 5.78 L (ref 4.52–6.92)
IVC SINGLE BREATH LLN: 4.52
IVC SINGLE BREATH PRE REF: 101.2 %
IVC SINGLE BREATH REF: 5.71
MVV LLN: 144
MVV PRE REF: 85.2 %
MVV REF: 169
PEF CHG: -3 %
PEF LLN: 8.27
PEF POST REF: 102.1 %
PEF PRE REF: 105.2 %
PEF REF: 10.79
POST FEF 25 75: 5.04 L/S (ref 2.39–6.52)
POST FET 100: 8.96 SEC
POST FEV1 FVC: 81.83 % (ref 69.08–88.42)
POST FEV1: 4.85 L (ref 3.57–5.42)
POST FVC: 5.92 L (ref 4.52–6.92)
POST PEF: 11.01 L/S (ref 8.27–13.3)
PRE DLCO: 34.82 ML/(MIN*MMHG) (ref 28.1–41.96)
PRE ERV: 0.99 L (ref -16448.51–16451.49)
PRE FEF 25 75: 5.18 L/S (ref 2.39–6.52)
PRE FET 100: 8.06 SEC
PRE FEV1 FVC: 82.36 % (ref 69.08–88.42)
PRE FEV1: 4.91 L (ref 3.57–5.42)
PRE FRC PL: 2.91 L (ref 2.65–4.62)
PRE FVC: 5.96 L (ref 4.52–6.92)
PRE MVV: 144.35 L/MIN (ref 144.03–194.86)
PRE PEF: 11.34 L/S (ref 8.27–13.3)
PRE RV: 1.92 L (ref 1.47–2.82)
PRE TLC: 7.88 L (ref 6.58–8.89)
RAW LLN: 3.06
RAW PRE REF: 112 %
RAW PRE: 3.43 CMH2O*S/L (ref 3.06–3.06)
RAW REF: 3.06
RV LLN: 1.47
RV PRE REF: 89.4 %
RV REF: 2.15
RVTLC LLN: 22
RVTLC PRE REF: 79.2 %
RVTLC PRE: 24.34 % (ref 21.75–39.71)
RVTLC REF: 31
TLC LLN: 6.58
TLC PRE REF: 101.9 %
TLC REF: 7.74
VA PRE: 7.02 L (ref 7.59–7.59)
VA SINGLE BREATH LLN: 7.59
VA SINGLE BREATH PRE REF: 92.6 %
VA SINGLE BREATH REF: 7.59
VC LLN: 4.52
VC PRE REF: 104.4 %
VC PRE: 5.96 L (ref 4.52–6.92)
VC REF: 5.71

## 2023-10-30 PROCEDURE — 71250 CT CHEST HIGH RESOLUTION WITHOUT CONTRAST: ICD-10-PCS | Mod: 26,,, | Performed by: RADIOLOGY

## 2023-10-30 PROCEDURE — 71250 CT THORAX DX C-: CPT | Mod: TC

## 2023-10-30 PROCEDURE — 71250 CT THORAX DX C-: CPT | Mod: 26,,, | Performed by: RADIOLOGY

## 2023-10-30 PROCEDURE — 94729 DIFFUSING CAPACITY: CPT | Mod: 26,,, | Performed by: INTERNAL MEDICINE

## 2023-10-30 PROCEDURE — 94729 PR C02/MEMBANE DIFFUSE CAPACITY: ICD-10-PCS | Mod: 26,,, | Performed by: INTERNAL MEDICINE

## 2023-10-30 PROCEDURE — 94060 EVALUATION OF WHEEZING: CPT

## 2023-10-30 PROCEDURE — 94060 PR EVAL OF BRONCHOSPASM: ICD-10-PCS | Mod: 26,,, | Performed by: INTERNAL MEDICINE

## 2023-10-30 PROCEDURE — 94726 PULM FUNCT TST PLETHYSMOGRAP: ICD-10-PCS | Mod: 26,,, | Performed by: INTERNAL MEDICINE

## 2023-10-30 PROCEDURE — 94060 EVALUATION OF WHEEZING: CPT | Mod: 26,,, | Performed by: INTERNAL MEDICINE

## 2023-10-30 PROCEDURE — 94726 PLETHYSMOGRAPHY LUNG VOLUMES: CPT | Mod: 26,,, | Performed by: INTERNAL MEDICINE

## 2023-10-30 PROCEDURE — 94729 DIFFUSING CAPACITY: CPT

## 2023-10-30 PROCEDURE — 94726 PLETHYSMOGRAPHY LUNG VOLUMES: CPT

## 2023-10-30 RX ORDER — ALBUTEROL SULFATE 2.5 MG/.5ML
2.5 SOLUTION RESPIRATORY (INHALATION)
Status: DISPENSED | OUTPATIENT
Start: 2023-10-30

## 2023-10-30 RX ORDER — ALBUTEROL SULFATE 2.5 MG/.5ML
SOLUTION RESPIRATORY (INHALATION)
Status: DISCONTINUED
Start: 2023-10-30 | End: 2023-10-30 | Stop reason: HOSPADM

## 2023-10-31 ENCOUNTER — PATIENT MESSAGE (OUTPATIENT)
Dept: PULMONOLOGY | Facility: CLINIC | Age: 43
End: 2023-10-31
Payer: OTHER GOVERNMENT

## 2023-11-02 ENCOUNTER — PATIENT MESSAGE (OUTPATIENT)
Dept: ORTHOPEDICS | Facility: CLINIC | Age: 43
End: 2023-11-02
Payer: OTHER GOVERNMENT

## 2023-11-02 ENCOUNTER — TELEPHONE (OUTPATIENT)
Dept: ORTHOPEDICS | Facility: CLINIC | Age: 43
End: 2023-11-02
Payer: OTHER GOVERNMENT

## 2023-11-02 RX ORDER — IBUPROFEN 600 MG/1
600 TABLET ORAL 3 TIMES DAILY
Qty: 20 TABLET | Refills: 0 | Status: SHIPPED | OUTPATIENT
Start: 2023-11-02

## 2023-11-02 RX ORDER — ACETAMINOPHEN 500 MG
1000 TABLET ORAL 3 TIMES DAILY
Qty: 20 TABLET | Refills: 0 | Status: SHIPPED | OUTPATIENT
Start: 2023-11-02

## 2023-11-02 RX ORDER — TRAMADOL HYDROCHLORIDE 50 MG/1
50 TABLET ORAL EVERY 6 HOURS
Qty: 20 TABLET | Refills: 0 | Status: SHIPPED | OUTPATIENT
Start: 2023-11-02 | End: 2024-01-03

## 2023-11-02 NOTE — TELEPHONE ENCOUNTER
M for the patient. Notified of 6 AM arrival time to the [facility]. Requested confirmation.     Jeannie Lindsay MS, OTC  Clinical Assistant to Dr. Ross Dunbar [facility]

## 2023-11-02 NOTE — TELEPHONE ENCOUNTER
Spoke with the patient. Notified of 6 AM arrival time to the [facility].  Informed of current visitor policy.  Reminded of NPO and need for transportation. Patient verbalized understanding to all.    Jeannie Lindsay MS, OTC  Clinical Assistant to Dr. Ross Dunbar [facility]      ----- Message from Kimberlyn Garcia sent at 11/2/2023 12:15 PM CDT -----  Contact: AMALIA PURDY [[id number]]  Type:  Patient Returning Call      Who Called:  AMALIA PURDY [[id number]]      Who Left Message for Patient: Jeannie Lindsay      Does the patient know what this is regarding?:  Yes      Would the patient rather a call back or a response via [facility]?  Call back       Best Call Back Number: [phone] (home)       Additional Information:

## 2023-11-03 ENCOUNTER — HOSPITAL ENCOUNTER (OUTPATIENT)
Facility: HOSPITAL | Age: 43
Discharge: HOME | End: 2023-11-03
Attending: ORTHOPAEDIC SURGERY | Admitting: ORTHOPAEDIC SURGERY
Payer: OTHER GOVERNMENT

## 2023-11-03 ENCOUNTER — ANESTHESIA (OUTPATIENT)
Dept: SURGERY | Facility: HOSPITAL | Age: 43
End: 2023-11-03
Payer: OTHER GOVERNMENT

## 2023-11-03 VITALS
WEIGHT: 243 LBS | TEMPERATURE: 99 F | HEIGHT: 73 IN | DIASTOLIC BLOOD PRESSURE: 91 MMHG | SYSTOLIC BLOOD PRESSURE: 157 MMHG | BODY MASS INDEX: 32.2 KG/M2 | RESPIRATION RATE: 16 BRPM | HEART RATE: 77 BPM | OXYGEN SATURATION: 99 %

## 2023-11-03 DIAGNOSIS — G56.01 RIGHT CARPAL TUNNEL SYNDROME: ICD-10-CM

## 2023-11-03 DIAGNOSIS — G56.21 CUBITAL TUNNEL SYNDROME ON RIGHT: ICD-10-CM

## 2023-11-03 PROCEDURE — 25000003 PHARM REV CODE 250: Performed by: NURSE ANESTHETIST, CERTIFIED REGISTERED

## 2023-11-03 PROCEDURE — 63600175 PHARM REV CODE 636 W HCPCS: Performed by: PHYSICIAN ASSISTANT

## 2023-11-03 PROCEDURE — 37000008 HC ANESTHESIA 1ST 15 MINUTES: Performed by: ORTHOPAEDIC SURGERY

## 2023-11-03 PROCEDURE — 64415 NJX AA&/STRD BRCH PLXS IMG: CPT | Mod: 59,RT | Performed by: STUDENT IN AN ORGANIZED HEALTH CARE EDUCATION/TRAINING PROGRAM

## 2023-11-03 PROCEDURE — 36000706: Performed by: ORTHOPAEDIC SURGERY

## 2023-11-03 PROCEDURE — 01710 ANES PX NRV MUSC UPR A&E NOS: CPT | Performed by: ORTHOPAEDIC SURGERY

## 2023-11-03 PROCEDURE — 37000009 HC ANESTHESIA EA ADD 15 MINS: Performed by: ORTHOPAEDIC SURGERY

## 2023-11-03 PROCEDURE — 63600175 PHARM REV CODE 636 W HCPCS: Performed by: STUDENT IN AN ORGANIZED HEALTH CARE EDUCATION/TRAINING PROGRAM

## 2023-11-03 PROCEDURE — 27201423 OPTIME MED/SURG SUP & DEVICES STERILE SUPPLY: Performed by: ORTHOPAEDIC SURGERY

## 2023-11-03 PROCEDURE — 36000707: Performed by: ORTHOPAEDIC SURGERY

## 2023-11-03 PROCEDURE — 63600175 PHARM REV CODE 636 W HCPCS: Performed by: NURSE ANESTHETIST, CERTIFIED REGISTERED

## 2023-11-03 PROCEDURE — 99900035 HC TECH TIME PER 15 MIN (STAT)

## 2023-11-03 PROCEDURE — 25000003 PHARM REV CODE 250: Performed by: PHYSICIAN ASSISTANT

## 2023-11-03 PROCEDURE — 64718 REVISE ULNAR NERVE AT ELBOW: CPT | Mod: RT,,, | Performed by: ORTHOPAEDIC SURGERY

## 2023-11-03 PROCEDURE — D9220A PRA ANESTHESIA: ICD-10-PCS | Mod: ANES,,, | Performed by: STUDENT IN AN ORGANIZED HEALTH CARE EDUCATION/TRAINING PROGRAM

## 2023-11-03 PROCEDURE — 29848 WRIST ENDOSCOPY/SURGERY: CPT | Mod: 51,RT,, | Performed by: ORTHOPAEDIC SURGERY

## 2023-11-03 PROCEDURE — 94761 N-INVAS EAR/PLS OXIMETRY MLT: CPT

## 2023-11-03 PROCEDURE — D9220A PRA ANESTHESIA: ICD-10-PCS | Mod: CRNA,,, | Performed by: NURSE ANESTHETIST, CERTIFIED REGISTERED

## 2023-11-03 PROCEDURE — 64718 PR REVISE ULNAR NERVE AT ELBOW: ICD-10-PCS | Mod: RT,,, | Performed by: ORTHOPAEDIC SURGERY

## 2023-11-03 PROCEDURE — 25000003 PHARM REV CODE 250: Performed by: STUDENT IN AN ORGANIZED HEALTH CARE EDUCATION/TRAINING PROGRAM

## 2023-11-03 PROCEDURE — 29848 PR WRIST ARTHROSCOP,RELEASE XVERS LIG: ICD-10-PCS | Mod: 51,RT,, | Performed by: ORTHOPAEDIC SURGERY

## 2023-11-03 PROCEDURE — 71000033 HC RECOVERY, INTIAL HOUR: Performed by: ORTHOPAEDIC SURGERY

## 2023-11-03 PROCEDURE — 71000015 HC POSTOP RECOV 1ST HR: Performed by: ORTHOPAEDIC SURGERY

## 2023-11-03 PROCEDURE — D9220A PRA ANESTHESIA: Mod: CRNA,,, | Performed by: NURSE ANESTHETIST, CERTIFIED REGISTERED

## 2023-11-03 PROCEDURE — 64415 SUPRACLAVICULAR SINGLE SHOT: ICD-10-PCS | Mod: 59,RT,, | Performed by: STUDENT IN AN ORGANIZED HEALTH CARE EDUCATION/TRAINING PROGRAM

## 2023-11-03 PROCEDURE — D9220A PRA ANESTHESIA: Mod: ANES,,, | Performed by: STUDENT IN AN ORGANIZED HEALTH CARE EDUCATION/TRAINING PROGRAM

## 2023-11-03 RX ORDER — HALOPERIDOL 5 MG/ML
0.5 INJECTION INTRAMUSCULAR EVERY 10 MIN PRN
Status: DISCONTINUED | OUTPATIENT
Start: 2023-11-03 | End: 2023-11-03 | Stop reason: HOSPADM

## 2023-11-03 RX ORDER — HYDROMORPHONE HYDROCHLORIDE 1 MG/ML
0.2 INJECTION, SOLUTION INTRAMUSCULAR; INTRAVENOUS; SUBCUTANEOUS EVERY 5 MIN PRN
Status: DISCONTINUED | OUTPATIENT
Start: 2023-11-03 | End: 2023-11-03 | Stop reason: HOSPADM

## 2023-11-03 RX ORDER — ONDANSETRON 2 MG/ML
4 INJECTION INTRAMUSCULAR; INTRAVENOUS DAILY PRN
Status: DISCONTINUED | OUTPATIENT
Start: 2023-11-03 | End: 2023-11-03 | Stop reason: HOSPADM

## 2023-11-03 RX ORDER — MIDAZOLAM HYDROCHLORIDE 1 MG/ML
.5-4 INJECTION INTRAMUSCULAR; INTRAVENOUS
Status: DISCONTINUED | OUTPATIENT
Start: 2023-11-03 | End: 2023-11-03 | Stop reason: HOSPADM

## 2023-11-03 RX ORDER — FENTANYL CITRATE 50 UG/ML
25 INJECTION, SOLUTION INTRAMUSCULAR; INTRAVENOUS EVERY 5 MIN PRN
Status: DISCONTINUED | OUTPATIENT
Start: 2023-11-03 | End: 2023-11-03 | Stop reason: HOSPADM

## 2023-11-03 RX ORDER — MIDAZOLAM HYDROCHLORIDE 1 MG/ML
INJECTION, SOLUTION INTRAMUSCULAR; INTRAVENOUS
Status: DISCONTINUED | OUTPATIENT
Start: 2023-11-03 | End: 2023-11-03

## 2023-11-03 RX ORDER — CELECOXIB 200 MG/1
400 CAPSULE ORAL
Status: COMPLETED | OUTPATIENT
Start: 2023-11-03 | End: 2023-11-03

## 2023-11-03 RX ORDER — HYDROCODONE BITARTRATE AND ACETAMINOPHEN 5; 325 MG/1; MG/1
1 TABLET ORAL EVERY 4 HOURS PRN
Status: DISCONTINUED | OUTPATIENT
Start: 2023-11-03 | End: 2023-11-03 | Stop reason: HOSPADM

## 2023-11-03 RX ORDER — SODIUM CHLORIDE 0.9 % (FLUSH) 0.9 %
10 SYRINGE (ML) INJECTION
Status: DISCONTINUED | OUTPATIENT
Start: 2023-11-03 | End: 2023-11-03 | Stop reason: HOSPADM

## 2023-11-03 RX ORDER — FENTANYL CITRATE 50 UG/ML
25-200 INJECTION, SOLUTION INTRAMUSCULAR; INTRAVENOUS
Status: DISCONTINUED | OUTPATIENT
Start: 2023-11-03 | End: 2023-11-03 | Stop reason: HOSPADM

## 2023-11-03 RX ORDER — DEXAMETHASONE SODIUM PHOSPHATE 4 MG/ML
INJECTION, SOLUTION INTRA-ARTICULAR; INTRALESIONAL; INTRAMUSCULAR; INTRAVENOUS; SOFT TISSUE
Status: DISCONTINUED | OUTPATIENT
Start: 2023-11-03 | End: 2023-11-03

## 2023-11-03 RX ORDER — ACETAMINOPHEN 500 MG
1000 TABLET ORAL
Status: COMPLETED | OUTPATIENT
Start: 2023-11-03 | End: 2023-11-03

## 2023-11-03 RX ORDER — OXYCODONE HYDROCHLORIDE 5 MG/1
5 TABLET ORAL
Status: DISCONTINUED | OUTPATIENT
Start: 2023-11-03 | End: 2023-11-03 | Stop reason: HOSPADM

## 2023-11-03 RX ORDER — PROPOFOL 10 MG/ML
VIAL (ML) INTRAVENOUS CONTINUOUS PRN
Status: DISCONTINUED | OUTPATIENT
Start: 2023-11-03 | End: 2023-11-03

## 2023-11-03 RX ORDER — ONDANSETRON 4 MG/1
8 TABLET, ORALLY DISINTEGRATING ORAL EVERY 8 HOURS PRN
Status: DISCONTINUED | OUTPATIENT
Start: 2023-11-03 | End: 2023-11-03 | Stop reason: HOSPADM

## 2023-11-03 RX ORDER — FAMOTIDINE 10 MG/ML
INJECTION INTRAVENOUS
Status: DISCONTINUED | OUTPATIENT
Start: 2023-11-03 | End: 2023-11-03

## 2023-11-03 RX ORDER — DEXMEDETOMIDINE HYDROCHLORIDE 100 UG/ML
INJECTION, SOLUTION INTRAVENOUS
Status: DISCONTINUED | OUTPATIENT
Start: 2023-11-03 | End: 2023-11-03

## 2023-11-03 RX ORDER — LIDOCAINE HYDROCHLORIDE 20 MG/ML
INJECTION INTRAVENOUS
Status: DISCONTINUED | OUTPATIENT
Start: 2023-11-03 | End: 2023-11-03

## 2023-11-03 RX ORDER — ROPIVACAINE HYDROCHLORIDE 5 MG/ML
INJECTION, SOLUTION EPIDURAL; INFILTRATION; PERINEURAL
Status: COMPLETED | OUTPATIENT
Start: 2023-11-03 | End: 2023-11-03

## 2023-11-03 RX ORDER — ONDANSETRON 2 MG/ML
INJECTION INTRAMUSCULAR; INTRAVENOUS
Status: DISCONTINUED | OUTPATIENT
Start: 2023-11-03 | End: 2023-11-03

## 2023-11-03 RX ORDER — MUPIROCIN 20 MG/G
OINTMENT TOPICAL 2 TIMES DAILY
Status: DISCONTINUED | OUTPATIENT
Start: 2023-11-03 | End: 2023-11-03 | Stop reason: HOSPADM

## 2023-11-03 RX ORDER — MUPIROCIN 20 MG/G
OINTMENT TOPICAL
Status: DISCONTINUED | OUTPATIENT
Start: 2023-11-03 | End: 2023-11-03 | Stop reason: HOSPADM

## 2023-11-03 RX ADMIN — MIDAZOLAM HYDROCHLORIDE 2 MG: 1 INJECTION, SOLUTION INTRAMUSCULAR; INTRAVENOUS at 08:11

## 2023-11-03 RX ADMIN — ROPIVACAINE HYDROCHLORIDE 30 ML: 5 INJECTION EPIDURAL; INFILTRATION; PERINEURAL at 07:11

## 2023-11-03 RX ADMIN — LIDOCAINE HYDROCHLORIDE 100 MG: 20 INJECTION INTRAVENOUS at 08:11

## 2023-11-03 RX ADMIN — FAMOTIDINE 20 MG: 10 INJECTION, SOLUTION INTRAVENOUS at 08:11

## 2023-11-03 RX ADMIN — ONDANSETRON 4 MG: 2 INJECTION INTRAMUSCULAR; INTRAVENOUS at 08:11

## 2023-11-03 RX ADMIN — CEFAZOLIN 2 G: 2 INJECTION, POWDER, FOR SOLUTION INTRAMUSCULAR; INTRAVENOUS at 08:11

## 2023-11-03 RX ADMIN — MUPIROCIN: 20 OINTMENT TOPICAL at 07:11

## 2023-11-03 RX ADMIN — CELECOXIB 400 MG: 200 CAPSULE ORAL at 07:11

## 2023-11-03 RX ADMIN — MIDAZOLAM 2 MG: 1 INJECTION INTRAMUSCULAR; INTRAVENOUS at 07:11

## 2023-11-03 RX ADMIN — SODIUM CHLORIDE: 9 INJECTION, SOLUTION INTRAVENOUS at 08:11

## 2023-11-03 RX ADMIN — PROPOFOL 125 MCG/KG/MIN: 10 INJECTION, EMULSION INTRAVENOUS at 08:11

## 2023-11-03 RX ADMIN — ACETAMINOPHEN 1000 MG: 500 TABLET ORAL at 07:11

## 2023-11-03 RX ADMIN — DEXMEDETOMIDINE 8 MCG: 100 INJECTION, SOLUTION, CONCENTRATE INTRAVENOUS at 08:11

## 2023-11-03 RX ADMIN — DEXAMETHASONE SODIUM PHOSPHATE 4 MG: 4 INJECTION, SOLUTION INTRAMUSCULAR; INTRAVENOUS at 08:11

## 2023-11-03 RX ADMIN — FENTANYL CITRATE 100 MCG: 50 INJECTION INTRAMUSCULAR; INTRAVENOUS at 07:11

## 2023-11-03 NOTE — H&P
[facility]  History & Physical  Orthopedics     SUBJECTIVE:       COVID-19 attestation:  This patient was treated during the COVID-19 pandemic.  This was discussed with the patient, they are aware of our current policies and procedures, were given the option of delaying their visit and or switching to a virtual visit, delaying their surgery when applicable, and they elect to proceed.     Chief Complaint:  Bilateral hand pain numbness and tingling     Referring Provider: No ref. provider found      History of Present Illness:  Patient is a 43 y.o. ambidextrous male who presents today with complaints of bilateral hand pain numbness and tingling.  The patient notes constant tingling sensations worst to the bilateral thumb index and long fingers.  This has been going on for several years.  He has attempted ibuprofen and nocturnal carpal tunnel bracing with minimal relief.  He presents today for initial evaluation with no other complaints.      Interval history July 20, 2023: The patient returns today for re-evaluation.  Notes that the pain numbness and tingling in his bilateral right greater than left hands persist.  He had a recent EMG and returns for those results and re-evaluation.     Interval History 8/30/23: the patient is seen today to sign consent for right carpal and cubital tunnel release, he is to be scheduled for 11/3/23 at [place]. Symptoms have remained the same since his previous visit.     The patient is a/an  with the [employer].     Onset of symptoms/DOI was several years ago.     Symptoms are aggravated by activity and movement.     Symptoms are alleviated by nothing     Symptoms consist of pain and numbness/tingling.     The patient rates their pain as a 3/10.     Attempted treatment(s) and/or interventions include activity modifications, rest, anti-inflammatory medications and immobilization.     The patient denies any fevers, chills, N/V, D/C and presents for  evaluation.             Past Medical History:   Diagnosis Date    Ankylosing spondylitis of unspecified sites in spine      Carpal tunnel syndrome on both sides      Tarsal tunnel syndrome, bilateral              Past Surgical History:   Procedure Laterality Date    left knee surgery        RHINOPLASTY        right foot surgery        VASECTOMY          Review of patient's allergies indicates:  No Known Allergies  Social History          Social History Narrative    Not on file            Family History   Problem Relation Age of Onset    Lung cancer Father      Breast cancer Maternal Grandmother      Lung cancer Paternal Grandmother      Lung cancer Paternal Grandfather              Current Outpatient Medications:     albuterol (PROVENTIL/VENTOLIN HFA) 90 mcg/actuation inhaler, Inhale into the lungs., Disp: , Rfl:     amoxicillin-clavulanate 875-125mg (AUGMENTIN) 875-125 mg per tablet, Take 1 tablet by mouth every 12 (twelve) hours. (Patient not taking: Reported on 8/18/2023), Disp: 14 tablet, Rfl: 0    fexofenadine (ALLEGRA ALLERGY) 180 MG tablet, Take 180 mg by mouth., Disp: , Rfl:     fluticasone-salmeterol diskus inhaler 250-50 mcg, Inhale 1 puff into the lungs 2 (two) times daily. Controller, Disp: 60 each, Rfl: 11     mg tablet, Take 800 mg by mouth 3 (three) times daily., Disp: , Rfl:     MULTIVITAMIN ORAL, Take by mouth., Disp: , Rfl:         Review of Systems:  As per HPI otherwise noncontributory     OBJECTIVE:       Vital Signs (Most Recent):  Vitals   There were no vitals filed for this visit.        There is no height or weight on file to calculate BMI.        Physical Exam:  Constitutional: The patient appears well-developed and well-nourished. No distress.   Skin: No lesions appreciated  Head: Normocephalic and atraumatic.   Nose: Nose normal.   Ears: No deformities seen  Eyes: Conjunctivae and EOM are normal.   Neck: No tracheal deviation present.   Cardiovascular: Normal rate and intact distal  pulses.    Pulmonary/Chest: Effort normal. No respiratory distress.   Abdominal: There is no guarding.   Neurological: The patient is alert.   Psychiatric: The patient has a normal mood and affect.      Bilateral Hand/Wrist Examination:     Observation/Inspection:  Swelling                       none                  Deformity                     none  Discoloration               none                  Scars                           none                  Atrophy                        none     HAND/WRIST EXAMINATION:  Finkelstein's Test                                Neg  WHAT Test                                         Neg  Snuff box tenderness                          Neg  Barber's Test                                     Neg  Hook of [place] Tenderness              Neg  CMC grind                                           Neg  Circumduction test                              Neg     Neurovascular Exam:  Digits WWP, brisk CR < 3s throughout  NVI motor/LTS to M/R/U nerves, radial pulse 2+  Tinel's Test - Carpal Tunnel                positive  Tinel's Test - Cubital Tunnel               positive  Phalen's Test                                      positive  Median Nerve Compression Test       positive     ROM hand full, painless     ROM wrist full, painless    ROM elbow full, painless     Abdomen not guarded  Respirations nonlabored  Perfusion intact     Diagnostic Results:     Imaging - I independently viewed the patient's imaging as well as the radiology report.  Xrays of the patient's bilateral hands  demonstrate no evidence of any acute fractures or dislocations with mild D IP degenerative changes.     EMG   -      ASSESSMENT/PLAN:       43 y.o. yo male with bilateral carpal greater than cubital tunnel syndromes     Plan: The patient and I had a thorough discussion today.  We discussed the working diagnosis as well as several other potential alternative diagnoses.  Treatment options were discussed, both  conservative and surgical.  Conservative treatment options would include things such as activity modifications, workplace modifications, a period of rest, oral vs topical OTC and prescription anti-inflammatory medications, occupational therapy, splinting/bracing, immobilization, corticosteroid injections, and others.  Surgical options were discussed as well.      At this time, he would like to proceed with surgery. He is consented for Right endoscopic vs open carpal tunnel release and ulnar nerve decompression at the elbow with Dr. Perez on 11/3/23. Case ordered for [place]. We will plan to consent for the left side procedure at his postop appointment. We also discuss the issue of his cervical radiculopathy, he has been referred to Spine for further evaluation.     The patient has not responded to adequate non operative treatment at this time and/or non operative treatment is not indicated. Thus, the risks, benefits and alternatives to surgery were discussed with the patient in detail.  Specific risks include but are not limited to bleeding, infection, vessel and/or nerve damage, pain, numbness, tingling, complex regional pain syndrome, compartment syndrome, failure to return to pre-injury and/or preoperative functional status, scar sensitivity, delayed healing, inability to return to work, pulley injury, tendon injury, bowstringing, partial and/or incomplete relief of symptoms, weakness, persistence of and/or worsening of symptoms, surgical failure, osteomyelitis, amputation, loss of function, stiffness, functional debility, dysfunction, decreased  strength, need for prolonged postoperative rehabilitation, need for further surgery, deep venous thrombosis, pulmonary embolism, arthritis and death.  The patient states an understanding and wishes to proceed with surgery.   All questions were answered.  No guarantees were implied or stated.  Written informed consent was obtained.       Should the patient's symptoms  worsen, persist, or fail to improve they should return for reevaluation and I would be happy to see them back anytime.       Jamie Russo-DiGeorge PA-C  [facility]

## 2023-11-03 NOTE — PLAN OF CARE
Pre Op checklist complete. Pt resting in bed with call light in reach. All questions answered. Pt belongings at bedside and plan for wife to hold. Will bring wife to bedside. Pt still needs site carmen/anes consent/updated.

## 2023-11-03 NOTE — ANESTHESIA PROCEDURE NOTES
Supraclavicular single shot    Patient location during procedure: pre-op   Block not for primary anesthetic.  Reason for block: at surgeon's request and post-op pain management   Post-op Pain Location: right upper extremity   Start time: 11/3/2023 7:11 AM  Timeout: 11/3/2023 7:10 AM   End time: 11/3/2023 7:15 AM    Staffing  Authorizing Provider: Gypsy Martinez MD  Performing Provider: Gypsy Martinez MD    Staffing  Performed by: Gypsy Martinez MD  Authorized by: Gypsy Martinez MD    Preanesthetic Checklist  Completed: patient identified, IV checked, site marked, risks and benefits discussed, surgical consent, monitors and equipment checked, pre-op evaluation and timeout performed  Peripheral Block  Patient position: supine  Prep: ChloraPrep  Patient monitoring: heart rate, cardiac monitor, continuous pulse ox, continuous capnometry and frequent blood pressure checks  Block type: supraclavicular  Laterality: right  Injection technique: single shot  Needle  Needle type: Stimuplex   Needle gauge: 22 G  Needle length: 2 in  Needle localization: anatomical landmarks and ultrasound guidance   -ultrasound image captured on disc.  Assessment  Injection assessment: negative aspiration, negative parasthesia and local visualized surrounding nerve  Paresthesia pain: none  Heart rate change: no  Slow fractionated injection: yes  Pain Tolerance: comfortable throughout block and no complaints  Medications:    Medications: ropivacaine (NAROPIN) injection 0.5% - Perineural   30 mL - 11/3/2023 7:14:00 AM    Additional Notes  VSS.  [facility] RN monitoring vitals throughout procedure.  Patient tolerated procedure well.

## 2023-11-03 NOTE — OP NOTE
ORTHOPEDIC SURGERY OPERATIVE NOTE     SERVICE: ORTHOPEDICS      DATE OF PROCEDURE: 11/03/2023     SURGEON: Hilton Perez M.D.     ASSISTANT: MD Helena     PREOPERATIVE DIAGNOSIS:  1) right sided carpal tunnel syndrome  2) right cubital tunnel syndrome     POSTOPERATIVE DIAGNOSIS:  1) right sided carpal tunnel syndrome  2) right cubital tunnel syndrome     PROCEDURE PERFORMED:   1) Endoscopic right sided carpal tunnel release, CPT code 20289  2) right ulnar nerve decompression at the elbow/cubital tunnel release     ANESTHESIA: Regional/MAC     ESTIMATED BLOOD LOSS: Minimal           SPECIMENS: None     COMPLICATIONS: None              CONDITION: Stable     IV FLUIDS: Crystalloid per anesthesia     IMPLANTS: None     TOURNIQUET TIME: 35 minutes at 250 mmHg     INDICATIONS FOR PROCEDURE:  The patient is a 42yo male who presented to clinic with findings and workup consistent with carpal tunnel syndrome of the right hand and cubital tunnel syndrome of the right upper extremity.  The patient had not responded to nonoperative management and wished to proceed with surgical intervention.  The risks, benefits and alternatives to surgery were discussed with the patient at great length and in great detail.  Specific risks discussed include but are not limited to bleeding, infection, vessel damage, nerve damage, pain, numbness, tingling, weakness, stiffness, complex regional pain syndrome, hardware/surgical failure, need for further surgery, DVT, PE, arthritis, scar sensitivity, delayed healing, need for prolonged postoperative rehabilitation, and death amongst others.  The patient stated an understanding of the risks and wished to proceed with surgery.   All questions were answered.  No guarantees were implied or stated.  Informed consent was obtained.     PROCEDURE IN DETAIL: With the patient's participation in the preoperative holding area, the operative upper extremity was marked at the surgical sites. Preoperative checks  were completed and consents were verified.  Regional anesthesia was administered.  The patient was brought to the Operating Room and placed in supine position.  A well-padded nonsterile tourniquet was placed on the upper arm.  The right arm was then prepped and draped in the usual sterile fashion.  Timeout was called for to verify the correct site, procedure and patient.  All were in agreement and we proceeded.  MAC anesthesia was introduced.       Attention was 1st turned towards the right sided endoscopic carpal tunnel release.  Esmarch was utilized to exsanguinate the operative upper extremity and tourniquet was inflated to 250mmHg.  Next, a small 1 cm transverse incision was made in line with a proximal wrist flexion crease beginning radially at the palmaris longus and extending ulnarly for 1 cm.  Dissection was continued to level of antebrachial fascia.  This was transversely incised in line with the skin incision.  A narrow double skin hook was then utilized to elevate the distal most aspect of the incision to gain access to the carpal tunnel.  A series of small and large dilators were utilized to dilate our planned path with the endoscope.  A synovial elevator was then utilized to free synovium from the undersurface of the transverse carpal ligament.  Washboard effect was confirmed.  At this time, the Arthrex endoscopic carpal tunnel system was introduced into the incision.  Confirmation of placement within the carpal tunnel was confirmed.  The endoscope was advanced the distal aspect of the transverse carpal ligament and the distal fat was visualized.  The median nerve was identified radial to the endoscope and was protected throughout the duration of the procedure.  Excellent visualization of the transverse carpal ligament along its entire length was confirmed with no interposed soft tissue.  I then began my division of the transverse carpal ligament from distally and extending in a proximal direction.  The  knife was deployed and complete full-thickness transection of the transverse carpal ligament was performed beginning distally and working my way more proximally.  Complete division of the ligament was performed. Care was taken distally to ensure not to cut past [place] Cardinal Line or injure the superficial palmar arch. The distal fat was visualized at the distalmost aspect of the ligament division. Tension and pressure within the carpal tunnel was dramatically improved and decreased status post ligament division.  The endoscope was then once again advanced more distally and completion of the division was confirmed.  Fat protruded through the divided ligament throughout.  The median nerve was identified along the length of our ligament division and remained intact and uninjured.  The endoscope was then removed from the wound and the antebrachial forearm fascia was divided in line with the ligament division by hand with tenotomy scissors.  The wound was then irrigated with copious amounts of sterile saline.  Skin was closed with 4 0 nylon suture.        Attention was next turned towards the cubital tunnel release.     A 5 cm Skin incision just posterior to the medial epicondyle of the right elbow  was then made sharply with a scalpel and dissection was carried down   Through the skin and subcutaneous tissues.  Several branches of the medial antebrachial   cutaneous nerve were identified and protected throughout the duration of the   procedure.  Dissection was carried down more deeply just posterior to the medial   epicondyle and a gentle dissection was carried down to identify the ulnar nerve   in the proximal portion of our incision.  Dissection was then continued   proximally and the ulnar nerve was completely decompressed all the way up the   medial intermuscular septum well proximal to the arcade of [place].  Finger dissection along the course of the nerve revealed   that there was no additional constriction  at this time and it was free and well   decompressed proximally.  I then continued my decompression of the ulnar nerve   more distally in the wound.  The nerve was decompressed in a proximal to distal   direction at this time, and motor branches to the flexor carpi ulnaris were   identified and protected throughout the dissection.  The nerve was very tightly compressed particularly at Solis ligament.  Upon releasing Solis ligament the nerve assumed an hourglass configuration.  It was very bulbous at this site after decompression and appeared to have been severely and chronically compressed.  The FCU fascia was also   released well distal to the mid aspect of the patient's forearm.  After   this, finger dissection was utilized to ensure complete release with no evidence   of any further constriction and I was very pleased with my decompression at   this time.  After I had ensured complete decompression with proximally and   distally, I then placed the operative elbow through a full range of motion with   flexion and extension and the nerve was stable and did not sublux with full   range of motion of the elbow.  At this time, I then irrigated the wound   copiously with sterile saline.  Tourniquet was then deflated and brisk capillary   refill ensued throughout the patient's operative upper extremity.  There was no   significant bleeding and hemostasis was achieved with bipolar electrocautery.    The subcutaneous tissues at the elbow incision were closed with 4-0 Vicryl   fascia in an inverted interrupted fashion and then the skin was closed with 4-0   Nylon.       Sterile dressings were applied consisting of Xeroform 4 x 4 gauze cast padding and a long-arm splint to the right upper extremity.  Tourniquet was deflated and brisk cap refill ensued.  There was no significant bleeding.  The patient was awakened from anesthesia return the post anesthesia care in stable condition.  There were no complications as  attending surgeon was present performed the entire procedure.      DISPOSITION: The patient will be discharged home with followup in approximately 2 weeks for suture removal.  Early active range of motion in the acute postoperative period was discussed and encouraged.  They are to keep the dressing clean, dry, and intact until that time.

## 2023-11-03 NOTE — ANESTHESIA POSTPROCEDURE EVALUATION
Anesthesia Post Evaluation    Patient: Jinny Rosado    Procedure(s) Performed: Procedure(s) (LRB):  RELEASE, CARPAL TUNNEL, ENDOSCOPIC - RIGHT (Right)  RELEASE, ULNAR TUNNEL - RIGHT (Right)    Final Anesthesia Type: general      Patient location during evaluation: PACU  Patient participation: Yes- Able to Participate  Level of consciousness: awake and alert  Post-procedure vital signs: reviewed and stable  Pain management: adequate  Airway patency: patent  TAMIA mitigation strategies: Multimodal analgesia  PONV status at discharge: No PONV  Anesthetic complications: no      Cardiovascular status: blood pressure returned to baseline and hemodynamically stable  Respiratory status: unassisted  Hydration status: euvolemic  Follow-up not needed.          Vitals Value Taken Time   /77 11/03/23 0931   Temp 37 11/03/23 0940   Pulse 77 11/03/23 0939   Resp 11 11/03/23 0922   SpO2 98 % 11/03/23 0939   Vitals shown include unvalidated device data.      No case tracking events are documented in the log.      Pain/Mercedez Score: Pain Rating Prior to Med Admin: 1 (11/3/2023  7:11 AM)  Mercedez Score: 9 (11/3/2023  9:21 AM)

## 2023-11-03 NOTE — ANESTHESIA PREPROCEDURE EVALUATION
"                                                                                                             11/03/2023  Pre-operative evaluation for Procedure(s) (LRB):  RELEASE, CARPAL TUNNEL, ENDOSCOPIC - RIGHT (Right)  RELEASE, ULNAR TUNNEL - RIGHT (Right)    Jinny Rosado is a 43 y.o. male     Patient Active Problem List   Diagnosis    Cervical radiculopathy    Neck pain    Moderate persistent asthma without complication       Review of patient's allergies indicates:  No Known Allergies    No current facility-administered medications on file prior to encounter.     Current Outpatient Medications on File Prior to Encounter   Medication Sig Dispense Refill    fluticasone-salmeterol diskus inhaler 250-50 mcg Inhale 1 puff into the lungs 2 (two) times daily. Controller 60 each 11    MAGNESIUM CARBONATE ORAL Take by mouth.      MULTIVITAMIN ORAL Take by mouth.       mg tablet Take 800 mg by mouth 3 (three) times daily.         Past Surgical History:   Procedure Laterality Date    EPIDURAL STEROID INJECTION INTO CERVICAL SPINE N/A 10/13/2023    Procedure: Injection-steroid-epidural-cervical;  Surgeon: Arpit Luna MD;  [facility];  Service: Anesthesiology;  Laterality: N/A;  c7-t1    left knee surgery      RHINOPLASTY      right foot surgery      VASECTOMY         Social History     Socioeconomic History    Marital status: Unknown   Tobacco Use    Smoking status: Former     Current packs/day: 0.00     Types: Cigarettes     Quit date: 2008     Years since quitting: 15.8    Smokeless tobacco: Never   Substance and Sexual Activity    Alcohol use: Yes     Comment: occ.    Drug use: Not Currently         CBC: No results for input(s): "WBC", "RBC", "HGB", "HCT", "PLT", "MCV", "MCH", "MCHC" in the last 72 hours.    CMP: No results for input(s): "NA", "K", "CL", "CO2", "BUN", "CREATININE", "GLU", "MG", "PHOS", "CALCIUM", "ALBUMIN", "PROT", "ALKPHOS", "ALT", "AST", "BILITOT" in the last " "72 hours.    INR  No results for input(s): "PT", "INR", "PROTIME", "APTT" in the last 72 hours.        Diagnostic Studies:      EKD Echo:  No results found for this or any previous visit.      Pre-op Assessment    I have reviewed the Patient Summary Reports.     I have reviewed the Nursing Notes. I have reviewed the NPO Status.   I have reviewed the Medications.     Review of Systems  Pulmonary:   Asthma Sleep Apnea        Physical Exam  General: Well nourished and Cooperative    Airway:  Mallampati: II   Mouth Opening: Normal  TM Distance: Normal  Tongue: Normal  Neck ROM: Normal ROM    Chest/Lungs:  Clear to auscultation, Normal Respiratory Rate    Heart:  Rate: Normal  Rhythm: Regular Rhythm  Sounds: Normal        Anesthesia Plan  Type of Anesthesia, risks & benefits discussed:    Anesthesia Type: Gen Natural Airway  Intra-op Monitoring Plan: Standard ASA Monitors  Post Op Pain Control Plan: multimodal analgesia and IV/PO Opioids PRN  Induction:  IV  Airway Plan: Direct and Video, Post-Induction  Informed Consent: Informed consent signed with the Patient and all parties understand the risks and agree with anesthesia plan.  All questions answered.   ASA Score: 2    Ready For Surgery From Anesthesia Perspective.     .      "

## 2023-11-03 NOTE — PLAN OF CARE
VSS, Pt instructions give to pt and spouse, verb understanding.  IV removed, catheter tip intact.  Sling in use Dsg., CDI, Family has home meds.

## 2023-11-03 NOTE — TRANSFER OF CARE
"Anesthesia Transfer of Care Note    Patient: Jinny Rosado    Procedure(s) Performed: Procedure(s) (LRB):  RELEASE, CARPAL TUNNEL, ENDOSCOPIC - RIGHT (Right)  RELEASE, ULNAR TUNNEL - RIGHT (Right)    Patient location: PACU    Anesthesia Type: general and regional    Transport from OR: Transported from OR on room air with adequate spontaneous ventilation    Post pain: adequate analgesia    Post assessment: no apparent anesthetic complications and tolerated procedure well    Post vital signs: stable    Level of consciousness: sedated and responds to stimulation    Nausea/Vomiting: no nausea/vomiting    Complications: none    Transfer of care protocol was followed      Last vitals:   Visit Vitals  /88 (BP Location: Left arm, Patient Position: Lying)   Pulse 79   Temp 37.2 °C (98.9 °F) (Temporal)   Resp 12   Ht 6' 1" (1.854 m)   Wt 110.2 kg (243 lb)   SpO2 97%   BMI 32.06 kg/m²     "

## 2023-11-03 NOTE — BRIEF OP NOTE
[facility] ([facility])  Brief Operative Note    Surgery Date: 11/3/2023     Surgeon(s) and Role:     * Hilton Perez MD - Primary    Assisting Surgeon: None    Pre-op Diagnosis:  Right carpal tunnel syndrome [G56.01]  Cubital tunnel syndrome on right [G56.21]    Post-op Diagnosis:  Post-Op Diagnosis Codes:     * Right carpal tunnel syndrome [G56.01]     * Cubital tunnel syndrome on right [G56.21]    Procedure(s) (LRB):  RELEASE, CARPAL TUNNEL, ENDOSCOPIC - RIGHT (Right)  RELEASE, ULNAR TUNNEL - RIGHT (Right)    Anesthesia: Regional    Operative Findings: See Op note    Estimated Blood Loss: * No values recorded between 11/3/2023  8:32 AM and 11/3/2023  9:16 AM *         Specimens:   Specimen (24h ago, onward)      None              Discharge Note    OUTCOME: Patient tolerated treatment/procedure well without complication and is now ready for discharge.    DISPOSITION: Home or Self Care    FINAL DIAGNOSIS:  <principal problem not specified>    FOLLOWUP: In clinic    DISCHARGE INSTRUCTIONS:    Discharge Procedure Orders   Diet general     Call MD for:  temperature >100.4     Call MD for:  persistent nausea and vomiting     Call MD for:  severe uncontrolled pain     Call MD for:  difficulty breathing, headache or visual disturbances     Call MD for:  redness, tenderness, or signs of infection (pain, swelling, redness, odor or green/yellow discharge around incision site)     Call MD for:  hives     Call MD for:  persistent dizziness or light-headedness     Call MD for:  extreme fatigue     Lifting restrictions     Leave dressing on - Keep it clean, dry, and intact until clinic visit

## 2023-11-17 ENCOUNTER — OFFICE VISIT (OUTPATIENT)
Dept: ORTHOPEDICS | Facility: CLINIC | Age: 43
End: 2023-11-17
Payer: OTHER GOVERNMENT

## 2023-11-17 DIAGNOSIS — Z98.890 POSTOPERATIVE STATE: ICD-10-CM

## 2023-11-17 DIAGNOSIS — G56.21 CUBITAL TUNNEL SYNDROME ON RIGHT: ICD-10-CM

## 2023-11-17 DIAGNOSIS — G56.01 RIGHT CARPAL TUNNEL SYNDROME: Primary | ICD-10-CM

## 2023-11-17 PROCEDURE — 99024 PR POST-OP FOLLOW-UP VISIT: ICD-10-PCS | Mod: ,,, | Performed by: PHYSICIAN ASSISTANT

## 2023-11-17 PROCEDURE — 99999 PR PBB SHADOW E&M-EST. PATIENT-LVL III: CPT | Mod: PBBFAC,,, | Performed by: PHYSICIAN ASSISTANT

## 2023-11-17 PROCEDURE — 99024 POSTOP FOLLOW-UP VISIT: CPT | Mod: ,,, | Performed by: PHYSICIAN ASSISTANT

## 2023-11-17 PROCEDURE — 99213 OFFICE O/P EST LOW 20 MIN: CPT | Mod: PBBFAC | Performed by: PHYSICIAN ASSISTANT

## 2023-11-17 PROCEDURE — 99999 PR PBB SHADOW E&M-EST. PATIENT-LVL III: ICD-10-PCS | Mod: PBBFAC,,, | Performed by: PHYSICIAN ASSISTANT

## 2023-11-17 NOTE — PROGRESS NOTES
Dr. Perez is the supervising physician for this encounter/patient    Jinny Rosado presents for post-operative evaluation.  The patient is now 2 weeks s/p Right endoscopic carpal tunnel release and cubital tunnel release with Dr. Perez on 11/3/23.  Overall the patient reports doing well.  He reports minimal soreness, denies any f/c/s and reports improvement in his numbness.    PE:    AA&O x 4.  NAD  HEENT:  NCAT, sclera nonicteric  Lungs:  Respirations are equal and unlabored.  CV:  2+ bilateral upper and lower extremity pulses.  MSK: The wounds are healing well with no signs of erythema or warmth.  There is no drainage.  No clinical signs or symptoms of infection are present.  Stiffness with elbow, wrist and hand motion. SILT. DNVI.    A/P: Status post above, doing well  1) OT ordered for postop rehab, focus on motion.  2) All sutures removed today. Wound care and signs of infection discussed. Scar massage discussed.  3) F/U 4 weeks  4) Call with any questions/concerns in the interim      Jamie Russo-DiGeorge PA-C

## 2023-12-01 ENCOUNTER — TELEPHONE (OUTPATIENT)
Dept: ORTHOPEDICS | Facility: CLINIC | Age: 43
End: 2023-12-01
Payer: OTHER GOVERNMENT

## 2023-12-13 PROBLEM — M25.521 RIGHT ELBOW PAIN: Status: ACTIVE | Noted: 2023-12-13

## 2023-12-13 PROBLEM — M25.531 RIGHT WRIST PAIN: Status: ACTIVE | Noted: 2023-12-13

## 2023-12-15 ENCOUNTER — TELEPHONE (OUTPATIENT)
Dept: ORTHOPEDICS | Facility: CLINIC | Age: 43
End: 2023-12-15
Payer: OTHER GOVERNMENT

## 2023-12-15 NOTE — TELEPHONE ENCOUNTER
Called and spoke with the pt and rescheduled his appt and informed the pt. Pt verbalized understanding and was thankful.

## 2023-12-21 DIAGNOSIS — Z20.828 EXPOSURE TO INFLUENZA: Primary | ICD-10-CM

## 2023-12-21 RX ORDER — OSELTAMIVIR PHOSPHATE 75 MG/1
75 CAPSULE ORAL DAILY
Qty: 14 CAPSULE | Refills: 0 | Status: SHIPPED | OUTPATIENT
Start: 2023-12-21 | End: 2024-01-03

## 2023-12-21 NOTE — PROGRESS NOTES
Patient evaluated and plan of care established.  Please sign and return if in agreement.    Thank you,  JEAN CLAUDE Arreola         Occupational Therapy [facility]  Initial Evaluation     Date: 12/27/2023  Name: Jinny Staleymery  [facility] Number: [id number]    Therapy Diagnosis: G56.01 (ICD-10-CM) - Right carpal tunnel syndrome, G56.21 (ICD-10-CM) - Cubital tunnel syndrome on right, Z98.890 (ICD-10-CM) - Postoperative state  Encounter Diagnoses   Name Primary?    Right elbow pain Yes    Right wrist pain      Physician: Russo-Digeorge, Jamie L*    Physician Orders: G56.01 (ICD-10-CM) - Right carpal tunnel syndrome, G56.21 (ICD-10-CM) - Cubital tunnel syndrome on right, Z98.890 (ICD-10-CM) - Postoperative state; evaluate and treat  Surgical Procedure and Date: status post (Right) carpal tunnel release;  (Right) ulnar nerve decompression at elbow / cubital tunnel release, 11/03/2023, Post op: 7 weeks, 5 days   Dominant Side: Ambidextrous; (Right)>(Left)   Date of Onset: 11/03/2023  History / Mechanism of Injury: Patient underwent (Right) carpal tunnel release and (Right) ulnar nerve decompression at elbow ( cubital tunnel release on 11/03/2023.  Patient went back for a post op follow up on 11/17/2023 and was referred to therapy for evaluation and treatment.   Previous Therapy:  none    Environmental Concerns/Fall Risk: None  Language: None  Cultural/Spiritual: None  Abuse/Neglect/Nutritional: None  Imaging / Tests: See Epic    Past Medical History/Physical Systems Review:    has a past medical history of Ankylosing spondylitis of unspecified sites in spine, Carpal tunnel syndrome on both sides, Sleep apnea, and Tarsal tunnel syndrome, bilateral.     has a past surgical history that includes left knee surgery; Vasectomy; right foot surgery; Rhinoplasty; Epidural steroid injection into cervical spine (N/A, 10/13/2023); Endoscopic carpal tunnel release (Right, 11/3/2023); and Release of ulnar nerve at cubital tunnel (Right,  "11/3/2023).    has a current medication list which includes the following prescription(s): acetaminophen, albuterol, fexofenadine, fluticasone-salmeterol 250-50 mcg/dose, ibu, ibuprofen, magnesium carbonate, multivitamin, and tramadol, and the following Facility-Administered Medications: albuterol sulfate and lactated ringers.    Review of patient's allergies indicates:  No Known Allergies     Insurance Authorization Period Expiration: 11/17/2023-03/15/2024  Plan of Care Certification Period: 12/27/2023-03/27/2024  Date of Return to MD: 01/04/2024     Occupation:   for [employer];  ( heavy lifting, heavy tool work)     Working presently: employed  Workers Compensation:  no      Visit # / Visits authorized: 1 / 1  Time In: 2:05  Time Out: 2:50   Total Billable Time:  10 minutes        Precautions:  Patient. Reports, "no known Cancer, no pacemaker, no allergies to latex or adhesives."      Subjective     Patient Reports, "I had surgery on my (Right) wrist and elbow.  I have been a  for 20 plus years.  I use lots of heavy tools and lift heavy items for work.  My fingers would go numb and I would have a lot of swelling and pain.  I tried braces and that did not seem to make a difference.  I had surgery on 11/03/2023 for (Right) carpal tunnel release and (Right) cubital tunnel release.  I still have shocking pains in my hand.  If I stretch my hand in extreme positions I still get shots of pain in my hand.  I have trouble lifting heavy items in the inside of my (Right) elbow and (Right) hand, turning a door knob, using utensils to cut, buttoning, zipping.  Sometimes my (Right) arm is fine and other times it does not want to work.  I have a lot of pain and problems pushing up off the floor or out of the chair.  I am having a lot of tenderness in the palm ( thenar & hypothenar eminence).  I was able to throw the football with my son the other day.  It felt a little weird. "     Pain   At " rest: 1/10  //  1/10  With work/ Activity:  3/10  // 3/10  Sleepin/10  //  0/10  Location of Pain: (Right) wrist  //  (Right) elbow    Patient's Goals for Therapy:  decrease pain,  increase function    Objective     Sensation: [place] Kavita: (Right)    Volar/ lateral aspect: 3.61   Volar / medial aspect: 2.83  Scar / Wound: closed, cool to touch, pinkish in color ; TTP around medial elbow around incision site   Edema:   centimeters (Right) / (Left)  Metacarpals: 22.9  / 23.0  Proximal Wrist Crease: 19.8 / 19.4  Elbow Crease: 30.6  / 30.7                                   Active Range of Motion: hand  Patient able to actively make composite tight (Bilateral) fists and oppose (Bilateral) thumbs to base of 5th digits      Active Range of Motion: elbow/wrist                         (Right)   //  (Left)  Elbow Extension/Flexion: 0 / 133  //  0/135  Dorsal Flexion /Volar Flexion:  45 / 60  //  60/70  Radial Deviation /Ulnar Deviation:  10    // 20 / 20  Supination / Pronation: 50 / 55 //  80/ 75      Manual Muscle Test:  Elbow / Wrist   (submaximally, within pain tolerance)        (Right)          Elbow Flexion:     4/5  Elbow Extension:  4/5  Dorsal Flexion:  3/5  Volar Flexion:   3/5  Radial Deviation: 3/5  Ulnar Deviation: 3+/5    Comments: pain reported during Manual Muscle Testing for (Right) wrist Dorsal Flexion, Volar Flexion      Strength: (KENNY Dynamometer in pounds),Position II   (submaximally, within pain tolerance)  (Right):  20  (Left):  80     Pinch Strength: (Pinch Gauge in pounds)   (submaximally, within pain tolerance)             (Right) /  (Left)  Lateral Pinch:   3 Point Pinch:    2 Point Pinch:  8  / 15            FOTO SCORE: 50%      Treatment Included:   Occupational Therapy  evaluation and instruction in written Home Exercise Program including RED Theraputty for rolling x 20 repetitions; RED Theraputty for 3 Point Pinch x 10 repetitions; RED Theraputty for  composite   x 10-20 repetitions x 2 x daily;  0# Dorsal Flexion, Volar Flexion, Radial Deviation, Ulnar Deviation x 10 repetitions; 0# Supination /Pronation or HAMMER stretch for Supination / Pronation x 3 seconds holds x 10 repetitions;  0-2# (depending on pain, pain free) bicep curls for elbow Extension / flexion (Supination / neutral / Pronation) x 10 repetitions  x 3 x daily.    Patient. Educated on modalities for home pain management, activity modifications and precautions, Multidirectional scar massage for scar management.   Patient. Demo understanding of above.     Patient/Family Education: role of Occupational Therapy, goals for Occupational Therapy, scheduling/cancellations - patient verbalized understanding. Discussed insurance limitations with patient.        Assessment:     Problem List :       Decreased Range of motion,  Decreased  and pinch strength,   Decreased muscle strength,   Decreased functional abilities,  Increased pain      During the evaluation, the patient required Minimal modification or assistance.  The following co-morbid conditions/personal factors may affect the plan of care: none.        Jinny Rosado is a 43 y.o. male referred to outpatient occupational therapy and presents with a medical diagnosis of G56.01 (ICD-10-CM) - Right carpal tunnel syndrome, G56.21 (ICD-10-CM) - Cubital tunnel syndrome on right, Z98.890 (ICD-10-CM) - Postoperative state, resulting in limited range of motion, strength, functional abilities, increased pain and inflammation and demonstrates limitations as described in the chart above. Following medical record review it is determined that patient will benefit from occupational therapy services in order to maximize pain free and/or functional use of right wrist / elbow. The following goals were discussed with the patient and patient is in agreement with them as to be addressed in the treatment plan. The patient's rehab potential is Good.     Anticipated  Barriers to Therapy: None     The following goals were discussed with the patient and patient is in agreement with them as to be addressed in the treatment plan.     Goals:   Goals:  1)   Patient to be Independent with Home exercise program and modalities for pain management by 1 week.   2)   Patient will report   1/10 (Right) wrist & 1/10 (Right) elbow pain on average with activity to assist with exercises by 6-8 weeks.  3)   Patient will increase range of Motion by 3-5 degrees to increase functional use for activities of daily living by 6-8 weeks.  4)   Patient will increase manual muscle testing by a grade to assist with lifting items by 6-8 weeks.     5)   Patient will increase  strength 3-5 pounds to open containers by 6-8 weeks.    6)   Patient will increase pinch by 1-3 pounds for buttoning by 6-8 weeks.          Plan:   Patient to be treated by Occupational Therapy    2    times per week for     90 days   during the certification period from   12/27/2023  to 03/27/2024     to achieve the established goals.  Treatment to include :  paraffin, fluidotherapy, manual therapy/joint mobilizations, kinesiotaping, dry needling performed by certified physical therapist,   modalities for pain management, Ultrasound 1.0 /3.0mhz, therapeutic exercises/activities,        strengthening,    as well as any other treatments deemed necessary based on the patient's needs or progress.     Will reassess as needed and adjust treatment plan accordingly.              I certify the need for these services furnished under this plan of treatment and while under my care    ____________________________________                         __________________  Physician/Referring Practitioner                                               Date of Signature    Jacquelyn Sandra, OT

## 2023-12-27 ENCOUNTER — CLINICAL SUPPORT (OUTPATIENT)
Dept: REHABILITATION | Facility: HOSPITAL | Age: 43
End: 2023-12-27
Payer: OTHER GOVERNMENT

## 2023-12-27 DIAGNOSIS — M25.521 RIGHT ELBOW PAIN: Primary | ICD-10-CM

## 2023-12-27 DIAGNOSIS — M25.531 RIGHT WRIST PAIN: ICD-10-CM

## 2023-12-27 PROCEDURE — 97530 THERAPEUTIC ACTIVITIES: CPT | Mod: PN

## 2023-12-27 PROCEDURE — 97165 OT EVAL LOW COMPLEX 30 MIN: CPT | Mod: PN

## 2023-12-27 NOTE — PLAN OF CARE
Patient evaluated and plan of care established.  Please sign and return if in agreement.    Thank you,  JEAN CLAUDE Arreola         Occupational Therapy [facility]  Initial Evaluation     Date: 12/27/2023  Name: Jinny Staleymery  [facility] Number: [id number]    Therapy Diagnosis: G56.01 (ICD-10-CM) - Right carpal tunnel syndrome, G56.21 (ICD-10-CM) - Cubital tunnel syndrome on right, Z98.890 (ICD-10-CM) - Postoperative state  Encounter Diagnoses   Name Primary?    Right elbow pain Yes    Right wrist pain      Physician: Russo-Digeorge, Jamie L*    Physician Orders: G56.01 (ICD-10-CM) - Right carpal tunnel syndrome, G56.21 (ICD-10-CM) - Cubital tunnel syndrome on right, Z98.890 (ICD-10-CM) - Postoperative state; evaluate and treat  Surgical Procedure and Date: status post (Right) carpal tunnel release;  (Right) ulnar nerve decompression at elbow / cubital tunnel release, 11/03/2023, Post op: 7 weeks, 5 days   Dominant Side: Ambidextrous; (Right)>(Left)   Date of Onset: 11/03/2023  History / Mechanism of Injury: Patient underwent (Right) carpal tunnel release and (Right) ulnar nerve decompression at elbow ( cubital tunnel release on 11/03/2023.  Patient went back for a post op follow up on 11/17/2023 and was referred to therapy for evaluation and treatment.   Previous Therapy:  none    Environmental Concerns/Fall Risk: None  Language: None  Cultural/Spiritual: None  Abuse/Neglect/Nutritional: None  Imaging / Tests: See Epic    Past Medical History/Physical Systems Review:    has a past medical history of Ankylosing spondylitis of unspecified sites in spine, Carpal tunnel syndrome on both sides, Sleep apnea, and Tarsal tunnel syndrome, bilateral.     has a past surgical history that includes left knee surgery; Vasectomy; right foot surgery; Rhinoplasty; Epidural steroid injection into cervical spine (N/A, 10/13/2023); Endoscopic carpal tunnel release (Right, 11/3/2023); and Release of ulnar nerve at cubital tunnel (Right,  "11/3/2023).    has a current medication list which includes the following prescription(s): acetaminophen, albuterol, fexofenadine, fluticasone-salmeterol 250-50 mcg/dose, ibu, ibuprofen, magnesium carbonate, multivitamin, and tramadol, and the following Facility-Administered Medications: albuterol sulfate and lactated ringers.    Review of patient's allergies indicates:  No Known Allergies     Insurance Authorization Period Expiration: 11/17/2023-03/15/2024  Plan of Care Certification Period: 12/27/2023-03/27/2024  Date of Return to MD: 01/04/2024     Occupation:   for [employer];  ( heavy lifting, heavy tool work)     Working presently: employed  Workers Compensation:  no      Visit # / Visits authorized: 1 / 1  Time In: 2:05  Time Out: 2:50   Total Billable Time:  10 minutes        Precautions:  Patient. Reports, "no known Cancer, no pacemaker, no allergies to latex or adhesives."      Subjective     Patient Reports, "I had surgery on my (Right) wrist and elbow.  I have been a  for 20 plus years.  I use lots of heavy tools and lift heavy items for work.  My fingers would go numb and I would have a lot of swelling and pain.  I tried braces and that did not seem to make a difference.  I had surgery on 11/03/2023 for (Right) carpal tunnel release and (Right) cubital tunnel release.  I still have shocking pains in my hand.  If I stretch my hand in extreme positions I still get shots of pain in my hand.  I have trouble lifting heavy items in the inside of my (Right) elbow and (Right) hand, turning a door knob, using utensils to cut, buttoning, zipping.  Sometimes my (Right) arm is fine and other times it does not want to work.  I have a lot of pain and problems pushing up off the floor or out of the chair.  I am having a lot of tenderness in the palm ( thenar & hypothenar eminence).  I was able to throw the football with my son the other day.  It felt a little weird. "     Pain   At " rest: 1/10  //  1/10  With work/ Activity:  3/10  // 3/10  Sleepin/10  //  0/10  Location of Pain: (Right) wrist  //  (Right) elbow    Patient's Goals for Therapy:  decrease pain,  increase function    Objective     Sensation: [place] Kavita: (Right)    Volar/ lateral aspect: 3.61   Volar / medial aspect: 2.83  Scar / Wound: closed, cool to touch, pinkish in color ; TTP around medial elbow around incision site   Edema:   centimeters (Right) / (Left)  Metacarpals: 22.9  / 23.0  Proximal Wrist Crease: 19.8 / 19.4  Elbow Crease: 30.6  / 30.7                                   Active Range of Motion: hand  Patient able to actively make composite tight (Bilateral) fists and oppose (Bilateral) thumbs to base of 5th digits      Active Range of Motion: elbow/wrist                         (Right)   //  (Left)  Elbow Extension/Flexion: 0 / 133  //  0/135  Dorsal Flexion /Volar Flexion:  45 / 60  //  60/70  Radial Deviation /Ulnar Deviation:  10    // 20 / 20  Supination / Pronation: 50 / 55 //  80/ 75      Manual Muscle Test:  Elbow / Wrist   (submaximally, within pain tolerance)        (Right)          Elbow Flexion:     4/5  Elbow Extension:  4/5  Dorsal Flexion:  3/5  Volar Flexion:   3/5  Radial Deviation: 3/5  Ulnar Deviation: 3+/5    Comments: pain reported during Manual Muscle Testing for (Right) wrist Dorsal Flexion, Volar Flexion      Strength: (KENNY Dynamometer in pounds),Position II   (submaximally, within pain tolerance)  (Right):  20  (Left):  80     Pinch Strength: (Pinch Gauge in pounds)   (submaximally, within pain tolerance)             (Right) /  (Left)  Lateral Pinch:   3 Point Pinch:    2 Point Pinch:  8  / 15            FOTO SCORE: 50%      Treatment Included:   Occupational Therapy  evaluation and instruction in written Home Exercise Program including RED Theraputty for rolling x 20 repetitions; RED Theraputty for 3 Point Pinch x 10 repetitions; RED Theraputty for  composite   x 10-20 repetitions x 2 x daily;  0# Dorsal Flexion, Volar Flexion, Radial Deviation, Ulnar Deviation x 10 repetitions; 0# Supination /Pronation or HAMMER stretch for Supination / Pronation x 3 seconds holds x 10 repetitions;  0-2# (depending on pain, pain free) bicep curls for elbow Extension / flexion (Supination / neutral / Pronation) x 10 repetitions  x 3 x daily.    Patient. Educated on modalities for home pain management, activity modifications and precautions, Multidirectional scar massage for scar management.   Patient. Demo understanding of above.     Patient/Family Education: role of Occupational Therapy, goals for Occupational Therapy, scheduling/cancellations - patient verbalized understanding. Discussed insurance limitations with patient.        Assessment:     Problem List :       Decreased Range of motion,  Decreased  and pinch strength,   Decreased muscle strength,   Decreased functional abilities,  Increased pain      During the evaluation, the patient required Minimal modification or assistance.  The following co-morbid conditions/personal factors may affect the plan of care: none.        Jinny Rosado is a 43 y.o. male referred to outpatient occupational therapy and presents with a medical diagnosis of G56.01 (ICD-10-CM) - Right carpal tunnel syndrome, G56.21 (ICD-10-CM) - Cubital tunnel syndrome on right, Z98.890 (ICD-10-CM) - Postoperative state, resulting in limited range of motion, strength, functional abilities, increased pain and inflammation and demonstrates limitations as described in the chart above. Following medical record review it is determined that patient will benefit from occupational therapy services in order to maximize pain free and/or functional use of right wrist / elbow. The following goals were discussed with the patient and patient is in agreement with them as to be addressed in the treatment plan. The patient's rehab potential is Good.     Anticipated  Barriers to Therapy: None     The following goals were discussed with the patient and patient is in agreement with them as to be addressed in the treatment plan.     Goals:   Goals:  1)   Patient to be Independent with Home exercise program and modalities for pain management by 1 week.   2)   Patient will report   1/10 (Right) wrist & 1/10 (Right) elbow pain on average with activity to assist with exercises by 6-8 weeks.  3)   Patient will increase range of Motion by 3-5 degrees to increase functional use for activities of daily living by 6-8 weeks.  4)   Patient will increase manual muscle testing by a grade to assist with lifting items by 6-8 weeks.     5)   Patient will increase  strength 3-5 pounds to open containers by 6-8 weeks.    6)   Patient will increase pinch by 1-3 pounds for buttoning by 6-8 weeks.          Plan:   Patient to be treated by Occupational Therapy    2    times per week for     90 days   during the certification period from   12/27/2023  to 03/27/2024     to achieve the established goals.  Treatment to include :  paraffin, fluidotherapy, manual therapy/joint mobilizations, kinesiotaping, dry needling performed by certified physical therapist,   modalities for pain management, Ultrasound 1.0 /3.0mhz, therapeutic exercises/activities,        strengthening,    as well as any other treatments deemed necessary based on the patient's needs or progress.     Will reassess as needed and adjust treatment plan accordingly.              I certify the need for these services furnished under this plan of treatment and while under my care    ____________________________________                         __________________  Physician/Referring Practitioner                                               Date of Signature    Jacquelyn Sandra, OT

## 2023-12-27 NOTE — PROGRESS NOTES
"                                  Occupational Therapy Daily Treatment Note       Date: 1/2/2024  Name: Jinny HEREDIA Rosado  [facility] Number: [id number]    Therapy Diagnosis: G56.01 (ICD-10-CM) - Right carpal tunnel syndrome, G56.21 (ICD-10-CM) - Cubital tunnel syndrome on right, Z98.890 (ICD-10-CM) - Postoperative state  Encounter Diagnoses   Name Primary?    Right wrist pain Yes    Right elbow pain      Physician: Russo-Digeorge, Jamie L*    Physician Orders: G56.01 (ICD-10-CM) - Right carpal tunnel syndrome, G56.21 (ICD-10-CM) - Cubital tunnel syndrome on right, Z98.890 (ICD-10-CM) - Postoperative state;  evaluate and treat  Medical Diagnosis: G56.01 (ICD-10-CM) - Right carpal tunnel syndrome, G56.21 (ICD-10-CM) - Cubital tunnel syndrome on right, Z98.890 (ICD-10-CM) - Postoperative state;  (Right) wrist and elbow pain  Surgical Procedure and Date: status post (Right) carpal tunnel release and (Right) cubital tunnel release, 11/03/2023    Insurance Authorization Period Expiration: 11/16/2023-03/15/2024  Plan of Care Certification Period: 12/27/2023-03/27/2024  Date of Return to MD: waiting on a call back    Evaluation FOTO: 12/27/2023 = 50%     Visit # / Visits authorized: 2 / 16  Time In: 7:30  Time Out: 8:15   Total Billable Time:  40 minutes    Precautions:  Standard      Post Op:  11/03/2023 =   8 weeks, 4 days      Subjective     Patient reports: "I am feeling sore in my wrist this morning.  I think its either the cold weather or stuff I did over the weekend.  The exercises are good.  I did not do as much since it was a holiday weekend.  I opened a container of cheese and had to pinched the corner to open it and it hurt the thumb of my hand."     Pain: 3/10   //  1/10  Location of pain: (Right) wrist  //  (Right) elbow     Objective    Patient seen by Occupational Therapy this session. Tx consisted of:      Supervised modalities after being cleared for contradictions  x    10 minutes:     -Fluidotherapy to  " "(Right)   hand to increase blood flow, circulation, tissue elasticity, desensitization, sensory re-education and for pain management  x 10 minutes.      Therapeutic Exercises to improve functional performance while increasing strength, endurance, range of motion,  and flexibility  x    17 minutes:    -Manual Therapy techniques to (Right) wrist including SUBMAXIMAL  joint mobilizations, stretching, and Passive Range of Motion to increase joint mobility, range of motion  and for pain management  x  4 minutes   - Soft Tissue Massage to volar aspect of (Right) wrist with Biofreeze x 1 minute     - 1# UBE in clockwise motion x 6 minutes to increase endurance and range of motion  - LARGE dowel board for elbow Extension /Flexion and Supination /Pronation   - 1# Dorsiflexion/Volar flexion (Supination /Pronation ), Radial Deviation / Ulnar Deviation x 10 repetitions  - Weighted ball on tabletop for Dorsiflexion /Volar Flexion  stretches x 10 repetitions  - YELLOW (non weighted) ball [place] for Supination/Pronation stretch ( in lap) x 10 reps    Therapeutic Activities  to improve functional performance while increasing independence with ADLs and IADLs   x    13 minutes:      - Wrist roller coaster for Active Range of Motion  of wrist in all planes x 10 repetitions    - Wrist wheel for Supination / Pronation stretch x 10 repetitions  - Mini colored pegboard for Fine Motor Coordination and in-hand manipulation  (5 in-hand) x 20 pegs    - 25# Progressive Hand Gripper (black spring) for composite  x 20 repetitions   - Wooden pegboard with YELLOW clothespins with 3PT pinch x 2 repetitions      Initiate in future therapy sessions:    - ### Theraputty for  PVC for "cookie cutting" and medicine top x 10  Repetitions    - ### Theraputty for pulling  - ### digiflex for isolated x 10 repetitions;  ### digiflex for composite digital flexion x 20 repetitions    - YELLOW digiweb for composite digital Extension and  intrinsics x 20 " "repetitions     -  #### dowel for Supination /Pronation  x 20 repetitions   - Low load prolonged stretches for Dorsiflexion /Volar Flexion with ### leg weight  ( on wedge) x 30 seconds each x 2 repetitions   - ### Flexbar for Supination /Pronation  and Dorsiflexion /Volar Flexion x 20 repetitions    - ### Wrist exerstick in standing for Dorsiflexion / Volar Flexion  x 3 repetitions     - Wrist wheel for Radial Deviation / Ulnar Deviation stretches x 10 repetitions         Assessment     Patient will continue to benefit from skilled Occupational Therapy intervention to increase functional abilities, range of motion, and strength and pain control.  Patient. demonstrated proper understanding of each exercise.  Patient continues to require verbal and tactile cues for throughout therapy session to maintain position and prevent compensation.   Patient continues to be limited in functional and leisurely pursuits. Pain limits patient's participation in activities of daily living.  Patient is not able to carryout necessary vocational tasks.   Patient's spiritual, cultural and educational needs considered and patient agreeable to plan of care and goals.  Patient is making good progress towards established goals.  Patient tolerated exercises / activities within pain tolerance.   Reviewed Home Exercise Program with patient., see EPIC under "patient instructions" for provided exercises, activity modifications and limitations, modalities for home pain management.  Patient. demo understanding of above.    Patient. tolerated Fluidotherapy  with no irritation.      New/Revised Goals: Continue Plan Of Care   Goals:  1)   Patient to be Independent with Home exercise program and modalities for pain management by 1 week. (MET)   2)   Patient will report   1/10 (Right) wrist & 1/10 (Right) elbow pain on average with activity to assist with exercises by 6-8 weeks.( In Progress)   3)   Patient will increase range of Motion by 3-5 degrees " to increase functional use for activities of daily living by 6-8 weeks.( In Progress)   4)   Patient will increase manual muscle testing by a grade to assist with lifting items by 6-8 weeks.   ( In Progress)   5)   Patient will increase  strength 3-5 pounds to open containers by 6-8 weeks.  ( In Progress)   6)   Patient will increase pinch by 1-3 pounds for buttoning by 6-8 weeks. ( In Progress)     Plan      Continue 2x week during the 90 day certification period    12/27/2023   to 03/27/2024   with established Plan Of Care in pursuit of Occupational Therapy goals.      Jacquelyn Sandra, OT

## 2024-01-02 ENCOUNTER — CLINICAL SUPPORT (OUTPATIENT)
Dept: REHABILITATION | Facility: HOSPITAL | Age: 44
End: 2024-01-02
Payer: OTHER GOVERNMENT

## 2024-01-02 DIAGNOSIS — M25.531 RIGHT WRIST PAIN: Primary | ICD-10-CM

## 2024-01-02 DIAGNOSIS — M25.521 RIGHT ELBOW PAIN: ICD-10-CM

## 2024-01-02 PROCEDURE — 97022 WHIRLPOOL THERAPY: CPT | Mod: PN

## 2024-01-02 PROCEDURE — 97530 THERAPEUTIC ACTIVITIES: CPT | Mod: PN

## 2024-01-02 PROCEDURE — 97110 THERAPEUTIC EXERCISES: CPT | Mod: PN

## 2024-01-02 NOTE — PROGRESS NOTES
"                                  Occupational Therapy Daily Treatment Note       Date: 1/5/2024  Name: Jinny HEREDIA Rosado  [facility] Number: [id number]    Therapy Diagnosis: G56.01 (ICD-10-CM) - Right carpal tunnel syndrome, G56.21 (ICD-10-CM) - Cubital tunnel syndrome on right, Z98.890 (ICD-10-CM) - Postoperative state  Encounter Diagnoses   Name Primary?    Right wrist pain Yes    Right elbow pain      Physician: Russo-Digeorge, Jamie L*    Physician Orders: G56.01 (ICD-10-CM) - Right carpal tunnel syndrome, G56.21 (ICD-10-CM) - Cubital tunnel syndrome on right, Z98.890 (ICD-10-CM) - Postoperative state;  evaluate and treat  Medical Diagnosis: G56.01 (ICD-10-CM) - Right carpal tunnel syndrome, G56.21 (ICD-10-CM) - Cubital tunnel syndrome on right, Z98.890 (ICD-10-CM) - Postoperative state;  (Right) wrist and elbow pain  Surgical Procedure and Date: status post (Right) carpal tunnel release and (Right) cubital tunnel release, 11/03/2023    Insurance Authorization Period Expiration: 11/16/2023-03/15/2024  Plan of Care Certification Period: 12/27/2023-03/27/2024  Date of Return to MD: waiting on a call back    Evaluation FOTO: 12/27/2023 = 50%     Visit # / Visits authorized: 3 / 16  Time In: 8:05  Time Out: 8:50  Total Billable Time:  40 minutes    Precautions:  Standard      Post Op:  11/03/2023 =   9 weeks, 0 days      Subjective     Patient reports: "I went to get some cabinets yesterday and had to move them around.  I was really sore after I lifted the cabinets. If anything touches my wrist it is sensitive."      Pain: 4/10   //  4/10  Location of pain: (Right) wrist  //  (Right) elbow     Objective    Patient seen by Occupational Therapy this session. Tx consisted of:      Supervised modalities after being cleared for contradictions  x    10 minutes:     -Fluidotherapy to  (Right)   hand to increase blood flow, circulation, tissue elasticity, desensitization, sensory re-education and for pain management  x 10 " "minutes.      Therapeutic Exercises to improve functional performance while increasing strength, endurance, range of motion,  and flexibility  x    17 minutes:    -Manual Therapy techniques to (Right) wrist including SUBMAXIMAL  joint mobilizations, stretching, and Passive Range of Motion to increase joint mobility, range of motion  and for pain management  x  4 minutes   - Soft Tissue Massage to volar aspect of (Right) wrist with Biofreeze x 1 minute     - 1.5# UBE in clockwise motion x 6 minutes to increase endurance and range of motion  - LARGE dowel board for elbow Extension /Flexion and Supination /Pronation -NP  - 1# Dorsiflexion/Volar flexion (Supination /Pronation ), Radial Deviation / Ulnar Deviation x 10 repetitions  - HAMMER for Supination /Pronation  x 10 repetitions   - Weighted ball on tabletop for Dorsiflexion /Volar Flexion  stretches x 10 repetitions  - YELLOW (non weighted) ball [place] for Supination/Pronation stretch ( in lap) x 10 repetitions  -NP  - YELLOW Theraband in standing for Tricep press x 20 repetitions   - YELLOW Theraband in standing for bicep curls x 20 repetitions     Therapeutic Activities  to improve functional performance while increasing independence with ADLs and IADLs   x    13 minutes:      - Wrist roller coaster for Active Range of Motion  of wrist in all planes x 10 repetitions    - Wrist wheel for Supination / Pronation stretch x 10 repetitions  - Mini colored pegboard for Fine Motor Coordination and in-hand manipulation  (5 in-hand) x 20 pegs    - 25# Progressive Hand Gripper (black spring) for composite  x 20 repetitions   - Wooden pegboard with YELLOW clothespins with 3PT pinch x 2 repetitions    -Updated Home Exercise Program: Patient provided with and educated on written Home Exercise Program with YELLOW Theraband for bicep curls and tricep press.  See EMR under "patient instructions."  Patient demo understanding of above.      -NP = Not Performed     Initiate in " "future therapy sessions:    - ### Theraputty for  PVC for "cookie cutting" and medicine top x 10  Repetitions    - ### Theraputty for pulling  - ### digiflex for isolated x 10 repetitions;  ### digiflex for composite digital flexion x 20 repetitions    - YELLOW digiweb for composite digital Extension and  intrinsics x 20 repetitions     - Low load prolonged stretches for Dorsiflexion /Volar Flexion with ### leg weight  ( on wedge) x 30 seconds each x 2 repetitions   - ### Flexbar for Supination /Pronation  and Dorsiflexion /Volar Flexion x 20 repetitions    - ### Wrist exerstick in standing for Dorsiflexion / Volar Flexion  x 3 repetitions     - Wrist wheel for Radial Deviation / Ulnar Deviation stretches x 10 repetitions         Assessment     Patient will continue to benefit from skilled Occupational Therapy intervention to increase functional abilities, range of motion, and strength and pain control.  Patient. demonstrated proper understanding of each exercise.  Patient continues to require verbal and tactile cues for throughout therapy session to maintain position and prevent compensation.   Patient continues to be limited in functional and leisurely pursuits. Pain limits patient's participation in activities of daily living.  Patient is not able to carryout necessary vocational tasks.   Patient's spiritual, cultural and educational needs considered and patient agreeable to plan of care and goals.  Patient is making good progress towards established goals.  Patient tolerated exercises / activities within pain tolerance.   Reviewed Home Exercise Program with patient., see EPIC under "patient instructions" for provided exercises, activity modifications and limitations, modalities for home pain management.  Patient. demo understanding of above.    Patient. tolerated Fluidotherapy  with no irritation.     Patient tolerated increase in resistance during ergometer;  addition of Theraband for tricep press and bicep " curls with no reported pain.  Patient tolerated addition of HAMMER for Supination / Pronation  stretch with tolerable stretching pain reported.      New/Revised Goals: Continue Plan Of Care   Goals:  1)   Patient to be Independent with Home exercise program and modalities for pain management by 1 week. (MET)   2)   Patient will report   1/10 (Right) wrist & 1/10 (Right) elbow pain on average with activity to assist with exercises by 6-8 weeks.( In Progress)   3)   Patient will increase range of Motion by 3-5 degrees to increase functional use for activities of daily living by 6-8 weeks.( In Progress)   4)   Patient will increase manual muscle testing by a grade to assist with lifting items by 6-8 weeks.   ( In Progress)   5)   Patient will increase  strength 3-5 pounds to open containers by 6-8 weeks.  ( In Progress)   6)   Patient will increase pinch by 1-3 pounds for buttoning by 6-8 weeks. ( In Progress)     Plan      Continue 2x week during the 90 day certification period    12/27/2023   to 03/27/2024   with established Plan Of Care in pursuit of Occupational Therapy goals.      Jacquelyn Sandra, OT

## 2024-01-03 ENCOUNTER — OFFICE VISIT (OUTPATIENT)
Dept: ENDOCRINOLOGY | Facility: CLINIC | Age: 44
End: 2024-01-03
Payer: OTHER GOVERNMENT

## 2024-01-03 ENCOUNTER — PATIENT MESSAGE (OUTPATIENT)
Dept: ENDOCRINOLOGY | Facility: CLINIC | Age: 44
End: 2024-01-03

## 2024-01-03 ENCOUNTER — LAB VISIT (OUTPATIENT)
Dept: LAB | Facility: HOSPITAL | Age: 44
End: 2024-01-03
Attending: INTERNAL MEDICINE
Payer: OTHER GOVERNMENT

## 2024-01-03 VITALS
WEIGHT: 243.81 LBS | OXYGEN SATURATION: 98 % | SYSTOLIC BLOOD PRESSURE: 130 MMHG | HEART RATE: 100 BPM | BODY MASS INDEX: 32.17 KG/M2 | DIASTOLIC BLOOD PRESSURE: 80 MMHG

## 2024-01-03 DIAGNOSIS — E89.0 POSTSURGICAL HYPOTHYROIDISM: ICD-10-CM

## 2024-01-03 DIAGNOSIS — E89.0 POSTOPERATIVE HYPOTHYROIDISM: Primary | ICD-10-CM

## 2024-01-03 DIAGNOSIS — E04.1 THYROID NODULE: ICD-10-CM

## 2024-01-03 DIAGNOSIS — E89.0 POSTOPERATIVE HYPOTHYROIDISM: ICD-10-CM

## 2024-01-03 LAB
ALBUMIN SERPL BCP-MCNC: 4.2 G/DL (ref 3.5–5.2)
ANION GAP SERPL CALC-SCNC: 11 MMOL/L (ref 8–16)
BUN SERPL-MCNC: 18 MG/DL (ref 6–20)
CALCIUM SERPL-MCNC: 9.3 MG/DL (ref 8.7–10.5)
CHLORIDE SERPL-SCNC: 102 MMOL/L (ref 95–110)
CO2 SERPL-SCNC: 26 MMOL/L (ref 23–29)
CREAT SERPL-MCNC: 1.1 MG/DL (ref 0.5–1.4)
EST. GFR  (NO RACE VARIABLE): >60 ML/MIN/1.73 M^2
GLUCOSE SERPL-MCNC: 72 MG/DL (ref 70–110)
PHOSPHATE SERPL-MCNC: 2.8 MG/DL (ref 2.7–4.5)
POTASSIUM SERPL-SCNC: 3.9 MMOL/L (ref 3.5–5.1)
SODIUM SERPL-SCNC: 139 MMOL/L (ref 136–145)
T4 FREE SERPL-MCNC: 1.18 NG/DL (ref 0.71–1.51)
TSH SERPL DL<=0.005 MIU/L-ACNC: 3.92 UIU/ML (ref 0.4–4)

## 2024-01-03 PROCEDURE — 84443 ASSAY THYROID STIM HORMONE: CPT | Performed by: INTERNAL MEDICINE

## 2024-01-03 PROCEDURE — 36415 COLL VENOUS BLD VENIPUNCTURE: CPT | Performed by: INTERNAL MEDICINE

## 2024-01-03 PROCEDURE — 99214 OFFICE O/P EST MOD 30 MIN: CPT | Mod: PBBFAC | Performed by: INTERNAL MEDICINE

## 2024-01-03 PROCEDURE — 80069 RENAL FUNCTION PANEL: CPT | Performed by: INTERNAL MEDICINE

## 2024-01-03 PROCEDURE — 84439 ASSAY OF FREE THYROXINE: CPT | Performed by: INTERNAL MEDICINE

## 2024-01-03 PROCEDURE — 99204 OFFICE O/P NEW MOD 45 MIN: CPT | Mod: S$PBB,,, | Performed by: INTERNAL MEDICINE

## 2024-01-03 PROCEDURE — 99999 PR PBB SHADOW E&M-EST. PATIENT-LVL IV: CPT | Mod: PBBFAC,,, | Performed by: INTERNAL MEDICINE

## 2024-01-03 RX ORDER — LEVOTHYROXINE SODIUM 75 UG/1
175 TABLET ORAL
COMMUNITY
Start: 2023-11-30 | End: 2024-01-04

## 2024-01-03 NOTE — ASSESSMENT & PLAN NOTE
Will check TFTs to determine if his thyroid dose needs to be adjusted.  Currently on levothyroxine 175 mcg daily.    Reviewed that our goal is a normal TSH. Avoid exogenous hyperthyroidism as this can accelerate bone loss and increase risk of CV complications.    Discussed the symptoms of hypo/hyperthyroidism for which to monitor and report.     Advised to send me a copy of his pathology report when he is able to pull it up for our records.

## 2024-01-03 NOTE — PROGRESS NOTES
"NEW PATIENT VISIT    Subjective:      Chief Complaint: Thyroid nodules, postsurgical hypothyroidism    HPI: Jinny Rosado is a 43 y.o. male who is here for thyroid nodules      Past Medical History:   Diagnosis Date    Ankylosing spondylitis of unspecified sites in spine     Carpal tunnel syndrome on both sides     Sleep apnea     Tarsal tunnel syndrome, bilateral        With regards to the thyroid nodules and postsurgical hypothyroidism:    Thyroid nodules originally found on MRI cervical spine in 9/2023 , and subsequently evaluated by ultrasound on 9/13/2023.    Last ultrasound in 9/2023 was independently reviewed. There is a 2.1 cm solid, isoechoic nodule in the right superior lobe and a 0.9 cm hypoechoic nodule in the left inferior lobe.    He underwent FNA of the right lobe nodule on 10/18/2023, which came back as FLUS. He saw Dr. Amador (ENT at [facility] in [place]) and opted to undergo diagnostic total thyroidectomy, which happened on 12/7/2023. Pathology was reportedly negative for thyroid cancer but showed some changes of autoimmune thyroid disease.    Started on LT4 - 175 mcg daily.  Gets tired easily - has to take a nap around 2 - 2:30 PM. This is new since he had surgery.   Also noticed he doesn't like coffee as much.   Scar is healed, but is still hard to the touch. He's been doing scar massage daily with Vitamin E oil as directed.  He was briefly on calcium post-operatively, but this was tapered off. He denies paresthesias or cramping.    Father had metastatic cancer, which they were told started in his thyroid. They do not have the records available to determine the type of cancer.       No results found for: "TSH"      Objective:     Vitals:    01/03/24 1321   BP: 130/80   Pulse: 100         BP Readings from Last 5 Encounters:   01/03/24 130/80   11/03/23 (!) 157/91   10/24/23 137/85   10/13/23 129/75   10/12/23 (!) 138/97         Physical Exam  Vitals and nursing note reviewed.   Constitutional:  "      General: He is not in acute distress.     Appearance: He is well-developed. He is not ill-appearing.   HENT:      Head: Normocephalic and atraumatic.   Eyes:      General:         Right eye: No discharge.         Left eye: No discharge.      Conjunctiva/sclera: Conjunctivae normal.   Neck:      Thyroid: No thyromegaly (surgically absent; scar healing well - still hyperpigmented and firm as expected).      Trachea: No tracheal deviation.   Pulmonary:      Effort: Pulmonary effort is normal. No respiratory distress.   Musculoskeletal:      Comments: No digital clubbing or extremity cyanosis   Neurological:      Mental Status: He is alert and oriented to person, place, and time.      Coordination: Coordination normal.   Psychiatric:         Mood and Affect: Mood normal.         Behavior: Behavior normal.           Wt Readings from Last 3 Encounters:   01/03/24 1321 110.6 kg (243 lb 13.3 oz)   11/03/23 0638 110.2 kg (243 lb)   10/24/23 0851 110.3 kg (243 lb 2.7 oz)       Assessment/Plan:     Postsurgical hypothyroidism  Will check TFTs to determine if his thyroid dose needs to be adjusted.  Currently on levothyroxine 175 mcg daily.    Reviewed that our goal is a normal TSH. Avoid exogenous hyperthyroidism as this can accelerate bone loss and increase risk of CV complications.    Discussed the symptoms of hypo/hyperthyroidism for which to monitor and report.     Advised to send me a copy of his pathology report when he is able to pull it up for our records.      Follow up in about 1 year (around 1/3/2025).

## 2024-01-04 RX ORDER — LEVOTHYROXINE SODIUM 200 UG/1
200 TABLET ORAL
Qty: 90 TABLET | Refills: 3 | Status: SHIPPED | OUTPATIENT
Start: 2024-01-04 | End: 2024-02-26

## 2024-01-04 NOTE — TELEPHONE ENCOUNTER
Lab Results   Component Value Date    TSH 3.923 01/03/2024    FREET4 1.18 01/03/2024       Reviewed labs.  Current dose of thyroid hormone is Generic levothyroxine 175 mcg daily    Change to Generic levothyroxine 200 mcg daily    Recheck labs in 6 weeks

## 2024-01-05 ENCOUNTER — CLINICAL SUPPORT (OUTPATIENT)
Dept: REHABILITATION | Facility: HOSPITAL | Age: 44
End: 2024-01-05
Payer: OTHER GOVERNMENT

## 2024-01-05 DIAGNOSIS — M25.531 RIGHT WRIST PAIN: Primary | ICD-10-CM

## 2024-01-05 DIAGNOSIS — M25.521 RIGHT ELBOW PAIN: ICD-10-CM

## 2024-01-05 PROCEDURE — 97110 THERAPEUTIC EXERCISES: CPT | Mod: PN

## 2024-01-05 PROCEDURE — 97530 THERAPEUTIC ACTIVITIES: CPT | Mod: PN

## 2024-01-05 PROCEDURE — 97022 WHIRLPOOL THERAPY: CPT | Mod: PN

## 2024-01-17 NOTE — PROGRESS NOTES
"                                  Occupational Therapy Progress Note       Date: 1/19/2024  Name: Jinny Delgadillogomery  [facility] Number: [id number]    Therapy Diagnosis: G56.01 (ICD-10-CM) - Right carpal tunnel syndrome, G56.21 (ICD-10-CM) - Cubital tunnel syndrome on right, Z98.890 (ICD-10-CM) - Postoperative state  Encounter Diagnoses   Name Primary?    Right wrist pain Yes    Right elbow pain      Physician: Russo-Digeorge, Jamie L*    Physician Orders: G56.01 (ICD-10-CM) - Right carpal tunnel syndrome, G56.21 (ICD-10-CM) - Cubital tunnel syndrome on right, Z98.890 (ICD-10-CM) - Postoperative state;  evaluate and treat  Medical Diagnosis: G56.01 (ICD-10-CM) - Right carpal tunnel syndrome, G56.21 (ICD-10-CM) - Cubital tunnel syndrome on right, Z98.890 (ICD-10-CM) - Postoperative state;  (Right) wrist and elbow pain  Surgical Procedure and Date: status post (Right) carpal tunnel release and (Right) cubital tunnel release, 11/03/2023    Insurance Authorization Period Expiration: 11/16/2023-03/15/2024  Plan of Care Certification Period: 12/27/2023-03/27/2024  Date of Return to MD: waiting on a call back    Evaluation FOTO: 12/27/2023 = 50%   Reassessment FOTO: 01/19/2024 = 45%    Visit # / Visits authorized: 4 / 16  Time In: 8:10  Time Out: 8:55   Total Billable Time:  40 minutes    Precautions:  Standard      Post Op:  11/03/2023 =   11 weeks, 0 days      Subjective     Patient reports: "I having a little dull ache in my wrist and will get a shocking pain if I push up with my hand.  My elbow has been bothering me lately because I haven't been babying it."     Pain: 1/10   //  3/10  Location of pain: (Right) wrist  //  (Right) elbow     Objective    Patient seen by Occupational Therapy this session. Tx consisted of:      Supervised modalities after being cleared for contradictions  x    10 minutes:     -Fluidotherapy to  (Right)   hand to increase blood flow, circulation, tissue elasticity, desensitization, sensory " "re-education and for pain management  x 10 minutes.      Therapeutic Exercises to improve functional performance while increasing strength, endurance, range of motion,  and flexibility  x    17 minutes:    -Manual Therapy techniques to (Right) wrist including SUBMAXIMAL  joint mobilizations, stretching, and Passive Range of Motion to increase joint mobility, range of motion  and for pain management  x  4 minutes   - Soft Tissue Massage to volar aspect of (Right) wrist with Biofreeze x 1 minute       - LARGE dowel board for elbow Extension /Flexion and Supination /Pronation -NP  - 1# Dorsiflexion/Volar flexion (Supination /Pronation ), Radial Deviation / Ulnar Deviation x 10 repetitions  - HAMMER for Supination /Pronation  x 10 repetitions   - Weighted ball on tabletop for Dorsiflexion /Volar Flexion  stretches x 10 repetitions  - YELLOW (non weighted) ball [place] for Supination/Pronation stretch ( in lap) x 10 repetitions  -NP  - YELLOW Theraband in standing for Tricep press x 20 repetitions   - YELLOW Theraband in standing for bicep curls x 20 repetitions     Therapeutic Activities  to improve functional performance while increasing independence with ADLs and IADLs   x    13 minutes:      - Wrist roller coaster for Active Range of Motion  of wrist in all planes x 10 repetitions    - Wrist wheel for Supination / Pronation stretch x 10 repetitions  - Mini colored pegboard for Fine Motor Coordination and in-hand manipulation  (5 in-hand) x 20 pegs    - 35# Progressive Hand Gripper (black spring) for composite  x 20 repetitions   - Wooden pegboard with RED clothespins with 3PT pinch x 2 repetitions      -NP = Not Performed     Initiate in future therapy sessions:    - 1.5# UBE in clockwise motion x 6 minutes to increase endurance and range of motion    - ### Theraputty for  PVC for "cookie cutting" and medicine top x 10  Repetitions    - ### Theraputty for pulling  - ### digiflex for isolated x 10 repetitions;  ### " digiflex for composite digital flexion x 20 repetitions    - YELLOW digiweb for composite digital Extension and  intrinsics x 20 repetitions     - Low load prolonged stretches for Dorsiflexion /Volar Flexion with ### leg weight  ( on wedge) x 30 seconds each x 2 repetitions   - ### Flexbar for Supination /Pronation  and Dorsiflexion /Volar Flexion x 20 repetitions    - ### Wrist exerstick in standing for Dorsiflexion / Volar Flexion  x 3 repetitions     - Wrist wheel for Radial Deviation / Ulnar Deviation stretches x 10 repetitions     Patient reassessed as follows:           Active Range of Motion: hand  Patient able to actively make composite tight (Bilateral) fists and oppose (Bilateral) thumbs to base of 5th digits      Active Range of Motion: elbow/wrist                         (Right)   //  (Left)  Elbow Extension/Flexion: 0 / 133  //  0/135  Dorsal Flexion /Volar Flexion:  50 (+5)  / 65 (+5)   //  60/70  Radial Deviation /Ulnar Deviation:  15 (+5)  / 25   // 20 / 20  Supination / Pronation: 75 (+25)  / 75 (+20)  //  80/ 75        Manual Muscle Test:  Elbow / Wrist   (submaximally, within pain tolerance)        (Right)          Elbow Flexion:     5/5  (+1)   Elbow Extension:  4/5  (+0)   Dorsal Flexion:  4/5  (+2)   Volar Flexion:   3+/5  (+1)   Radial Deviation: 4/5 (+2)   Ulnar Deviation: 4/5  (+1)        Strength: (KENNY Dynamometer in pounds),Position II   (submaximally, within pain tolerance)  (Right):  45 (+25)   (Left):  80     Pinch Strength: (Pinch Gauge in pounds)   (submaximally, within pain tolerance)             (Right) /  (Left)  Lateral Pinch: 16 (+0)  / 21  3 Point Pinch:  15 (+4)   / 22  2 Point Pinch:  10 (+2)  / 15    Assessment     Patient will continue to benefit from skilled Occupational Therapy intervention to increase functional abilities, range of motion, and strength and pain control.  Patient. demonstrated proper understanding of each exercise.  Patient continues to require  "verbal and tactile cues for throughout therapy session to maintain position and prevent compensation.   Patient continues to be limited in functional and leisurely pursuits. Pain limits patient's participation in activities of daily living.  Patient is not able to carryout necessary vocational tasks.   Patient's spiritual, cultural and educational needs considered and patient agreeable to plan of care and goals.  Patient is making good progress towards established goals.  Patient tolerated exercises / activities within pain tolerance.   Reviewed Home Exercise Program with patient., see EPIC under "patient instructions" for provided exercises, activity modifications and limitations, modalities for home pain management.  Patient. demo understanding of above.    Patient. tolerated Fluidotherapy  with no irritation.     Patient tolerated increase in resistance during Progressive Hand Gripper for composite , wooden pegboard with clothespins for 3 Point Pinch  with no reported pain.     Patient demo increased Active range of motion for (Right) wrist Dorsal Flexion, Volar Flexion, Radial Deviation, Supination, and Pronation; increased Manual Muscle Testing for (Right) elbow flexion, (Right) wrist Dorsal Flexion, Volar Flexion, Radial Deviation, Ulnar Deviation, and remained the same for elbow Extension; increased  strength, 3 Point Pinch, and 2 Point Pinch strength and remained the same for Lateral Pinch strength.      New/Revised Goals: Continue Plan Of Care   Goals:  1)   Patient to be Independent with Home exercise program and modalities for pain management by 1 week. (MET)   2)   Patient will report   0/10 (Right) wrist & 1/10 (Right) elbow pain on average with activity to assist with exercises by 6-8 weeks.( Revised 01/19/2024)   3)   Patient will increase range of Motion by 3-5 degrees to increase functional use for activities of daily living by 6-8 weeks.( In Progress)   4)   Patient will increase manual " muscle testing by a grade to assist with lifting items by 6-8 weeks.   ( In Progress)   5)   Patient will increase  strength 3-5 pounds to open containers by 6-8 weeks.  ( In Progress)   6)   Patient will increase pinch by 1-3 pounds for buttoning by 6-8 weeks. ( In Progress)     Plan      Continue 2x week during the 90 day certification period    12/27/2023   to 03/27/2024   with established Plan Of Care in pursuit of Occupational Therapy goals.      Jacquelyn Sandra, OT

## 2024-01-19 ENCOUNTER — CLINICAL SUPPORT (OUTPATIENT)
Dept: REHABILITATION | Facility: HOSPITAL | Age: 44
End: 2024-01-19
Payer: OTHER GOVERNMENT

## 2024-01-19 DIAGNOSIS — M25.531 RIGHT WRIST PAIN: Primary | ICD-10-CM

## 2024-01-19 DIAGNOSIS — M25.521 RIGHT ELBOW PAIN: ICD-10-CM

## 2024-01-19 PROCEDURE — 97110 THERAPEUTIC EXERCISES: CPT | Mod: PN

## 2024-01-19 PROCEDURE — 97530 THERAPEUTIC ACTIVITIES: CPT | Mod: PN

## 2024-01-19 PROCEDURE — 97022 WHIRLPOOL THERAPY: CPT | Mod: PN

## 2024-01-19 NOTE — PROGRESS NOTES
"                                  Occupational Therapy Daily Treatment Note       Date: 1/22/2024  Name: Jinny HEREDIA Rosado  [facility] Number: [id number]    Therapy Diagnosis: G56.01 (ICD-10-CM) - Right carpal tunnel syndrome, G56.21 (ICD-10-CM) - Cubital tunnel syndrome on right, Z98.890 (ICD-10-CM) - Postoperative state  Encounter Diagnoses   Name Primary?    Right wrist pain Yes    Right elbow pain      Physician: Russo-Digeorge, Jamie L*    Physician Orders: G56.01 (ICD-10-CM) - Right carpal tunnel syndrome, G56.21 (ICD-10-CM) - Cubital tunnel syndrome on right, Z98.890 (ICD-10-CM) - Postoperative state;  evaluate and treat  Medical Diagnosis: G56.01 (ICD-10-CM) - Right carpal tunnel syndrome, G56.21 (ICD-10-CM) - Cubital tunnel syndrome on right, Z98.890 (ICD-10-CM) - Postoperative state;  (Right) wrist and elbow pain  Surgical Procedure and Date: status post (Right) carpal tunnel release and (Right) cubital tunnel release, 11/03/2023    Insurance Authorization Period Expiration: 11/16/2023-03/15/2024  Plan of Care Certification Period: 12/27/2023-03/27/2024  Date of Return to MD: waiting on a call back    Evaluation FOTO: 12/27/2023 = 50%   Reassessment FOTO: 01/19/2024 = 45%    Visit # / Visits authorized: 5 / 16  Time In: 8:10  Time Out: 9:00  Total Billable Time:  40 minutes    Precautions:  Standard      Post Op:  11/03/2023 =   11 weeks, 3 days      Subjective     Patient reports: "I am still achy in my elbow.  I haven't really done much over the weekend so I am not feeling too bad."     Pain: 0/10   //  2/10  Location of pain: (Right) wrist  //  (Right) elbow     Objective    Patient seen by Occupational Therapy this session. Tx consisted of:      Supervised modalities after being cleared for contradictions  x    10 minutes:     -Fluidotherapy to  (Right)   hand to increase blood flow, circulation, tissue elasticity, desensitization, sensory re-education and for pain management  x 10 minutes.      Therapeutic " "Exercises to improve functional performance while increasing strength, endurance, range of motion,  and flexibility  x    17 minutes:    -Manual Therapy techniques to (Right) wrist including SUBMAXIMAL  joint mobilizations, stretching, and Passive Range of Motion to increase joint mobility, range of motion  and for pain management  x  4 minutes   - Soft Tissue Massage to volar aspect of (Right) wrist and medial aspect of (Right) elbow region with Biofreeze x 1 minute     - 1.5# UBE in clockwise motion x 6 minutes to increase endurance and range of motion  - LARGE dowel board for elbow Extension /Flexion and Supination /Pronation -NP  - 1# Dorsiflexion/Volar flexion (Supination /Pronation ), Radial Deviation / Ulnar Deviation x 10 repetitions  -NP  - HAMMER for Supination /Pronation  x 10 repetitions   - Weighted ball on tabletop for Dorsiflexion /Volar Flexion  stretches x 10 repetitions  - YELLOW (non weighted) ball [place] for Supination/Pronation stretch ( in lap) x 10 repetitions  -NP  - GREEN Theraband in standing for Tricep press x 20 repetitions   - GREEN Theraband in standing for bicep curls x 20 repetitions   - YELLOW Flexbar for Supination /Pronation  and Dorsiflexion /Volar Flexion x 20 repetitions      Therapeutic Activities  to improve functional performance while increasing independence with ADLs and IADLs   x    13 minutes:      - Wrist roller coaster for Active Range of Motion  of wrist in all planes x 10 repetitions    - Wrist wheel for Supination / Pronation stretch x 10 repetitions  - Mini colored pegboard for Fine Motor Coordination and in-hand manipulation  (5 in-hand) x 20 pegs    - 35# Progressive Hand Gripper (black spring) for composite  x 20 repetitions   - Wooden pegboard with RED clothespins with 3PT pinch x 2 repetitions      -NP = Not Performed     Initiate in future therapy sessions:        - ### Theraputty for  PVC for "cookie cutting" and medicine top x 10  Repetitions    - ### " "Theraputty for pulling  - ### digiflex for isolated x 10 repetitions;  ### digiflex for composite digital flexion x 20 repetitions    - YELLOW digiweb for composite digital Extension and  intrinsics x 20 repetitions     - Low load prolonged stretches for Dorsiflexion /Volar Flexion with ### leg weight  ( on wedge) x 30 seconds each x 2 repetitions    - ### Wrist exerstick in standing for Dorsiflexion / Volar Flexion  x 3 repetitions     - Wrist wheel for Radial Deviation / Ulnar Deviation stretches x 10 repetitions         Assessment     Patient will continue to benefit from skilled Occupational Therapy intervention to increase functional abilities, range of motion, and strength and pain control.  Patient. demonstrated proper understanding of each exercise.  Patient continues to require verbal and tactile cues for throughout therapy session to maintain position and prevent compensation.   Patient continues to be limited in functional and leisurely pursuits. Pain limits patient's participation in activities of daily living.  Patient is not able to carryout necessary vocational tasks.   Patient's spiritual, cultural and educational needs considered and patient agreeable to plan of care and goals.  Patient is making good progress towards established goals.  Patient tolerated exercises / activities within pain tolerance.   Reviewed Home Exercise Program with patient., see EPIC under "patient instructions" for provided exercises, activity modifications and limitations, modalities for home pain management.  Patient. demo understanding of above.    Patient. tolerated Fluidotherapy  with no irritation.     Patient tolerated increase in resistance during Theraband for tricep press and bicep curls with no reported pain.   Patient tolerated addition of Flexbar for Supination / Pronation, Dorsal Flexion, Volar Flexion with no reported pain.        New/Revised Goals: Continue Plan Of Care   Goals:  1)   Patient to be Independent " with Home exercise program and modalities for pain management by 1 week. (MET)   2)   Patient will report   0/10 (Right) wrist & 1/10 (Right) elbow pain on average with activity to assist with exercises by 6-8 weeks.( Revised 01/19/2024)   3)   Patient will increase range of Motion by 3-5 degrees to increase functional use for activities of daily living by 6-8 weeks.( In Progress)   4)   Patient will increase manual muscle testing by a grade to assist with lifting items by 6-8 weeks.   ( In Progress)   5)   Patient will increase  strength 3-5 pounds to open containers by 6-8 weeks.  ( In Progress)   6)   Patient will increase pinch by 1-3 pounds for buttoning by 6-8 weeks. ( In Progress)     Plan      Continue 2x week during the 90 day certification period    12/27/2023   to 03/27/2024   with established Plan Of Care in pursuit of Occupational Therapy goals.      Jacquelyn Sandra, OT

## 2024-01-22 ENCOUNTER — CLINICAL SUPPORT (OUTPATIENT)
Dept: REHABILITATION | Facility: HOSPITAL | Age: 44
End: 2024-01-22
Payer: OTHER GOVERNMENT

## 2024-01-22 DIAGNOSIS — M25.521 RIGHT ELBOW PAIN: ICD-10-CM

## 2024-01-22 DIAGNOSIS — M25.531 RIGHT WRIST PAIN: Primary | ICD-10-CM

## 2024-01-22 PROCEDURE — 97022 WHIRLPOOL THERAPY: CPT | Mod: PN

## 2024-01-22 PROCEDURE — 97530 THERAPEUTIC ACTIVITIES: CPT | Mod: PN

## 2024-01-22 PROCEDURE — 97110 THERAPEUTIC EXERCISES: CPT | Mod: PN

## 2024-01-22 NOTE — PROGRESS NOTES
"                                  Occupational Therapy Daily Treatment Note       Date: 1/24/2024  Name: Jinny Delgadillogomery  [facility] Number: [id number]    Therapy Diagnosis: G56.01 (ICD-10-CM) - Right carpal tunnel syndrome, G56.21 (ICD-10-CM) - Cubital tunnel syndrome on right, Z98.890 (ICD-10-CM) - Postoperative state  Encounter Diagnoses   Name Primary?    Right elbow pain Yes    Right wrist pain      Physician: Russo-Digeorge, Jamie L*    Physician Orders: G56.01 (ICD-10-CM) - Right carpal tunnel syndrome, G56.21 (ICD-10-CM) - Cubital tunnel syndrome on right, Z98.890 (ICD-10-CM) - Postoperative state;  evaluate and treat  Medical Diagnosis: G56.01 (ICD-10-CM) - Right carpal tunnel syndrome, G56.21 (ICD-10-CM) - Cubital tunnel syndrome on right, Z98.890 (ICD-10-CM) - Postoperative state;  (Right) wrist and elbow pain  Surgical Procedure and Date: status post (Right) carpal tunnel release and (Right) cubital tunnel release, 11/03/2023    Insurance Authorization Period Expiration: 11/16/2023-03/15/2024  Plan of Care Certification Period: 12/27/2023-03/27/2024  Date of Return to MD: waiting on a call back    Evaluation FOTO: 12/27/2023 = 50%   Reassessment FOTO: 01/19/2024 = 45%    Visit # / Visits authorized: 6 / 16  Time In: 8:15  Time Out: 9:00    Total Billable Time:  40 minutes    Precautions:  Standard      Post Op:  11/03/2023 =   11 weeks, 5 days      Subjective     Patient reports: "I am pretty sore in my wrist.  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  Anytime I have something in my hand or go to grab something close to the incision it hurts.  I sometimes get a shocking pain in my hand."     Pain: 0/10   //  3/10  Location of pain: (Right) wrist  //  (Right) elbow     Objective    Patient seen by Occupational Therapy this session. Tx consisted of:      Supervised modalities after being cleared for contradictions  x    10 minutes:     -Fluidotherapy to  (Right)   hand to increase blood flow, " "circulation, tissue elasticity, desensitization, sensory re-education and for pain management  x 10 minutes.      Therapeutic Exercises to improve functional performance while increasing strength, endurance, range of motion,  and flexibility  x    15 minutes:    -Manual Therapy techniques to (Right) wrist including SUBMAXIMAL  joint mobilizations, stretching, and Passive Range of Motion to increase joint mobility, range of motion  and for pain management  x  4 minutes   - Soft Tissue Massage to volar aspect of (Right) wrist and medial aspect of (Right) elbow region with Biofreeze x 1 minute     - 2.0# UBE in clockwise motion x 6 minutes to increase endurance and range of motion  - 1# Dorsiflexion/Volar flexion (Supination /Pronation ), Radial Deviation / Ulnar Deviation x 10 repetitions  -NP  - HAMMER for Supination /Pronation  x 10 repetitions   - Weighted ball on tabletop for Dorsiflexion /Volar Flexion  stretches x 10 repetitions  - YELLOW (non weighted) ball [place] for Supination/Pronation stretch ( in lap) x 10 repetitions  -NP  - GREEN Theraband in standing for Tricep press x 20 repetitions  -NP  - GREEN Theraband in standing for bicep curls x 20 repetitions -NP  - YELLOW Flexbar for Supination /Pronation  and Dorsiflexion /Volar Flexion x 20 repetitions    - YELLOW Flexbar (ot holding horizontally) simulating turning knob x 20 repetitions     Therapeutic Activities  to improve functional performance while increasing independence with ADLs and IADLs   x    15 minutes:      - Wrist roller coaster for Active Range of Motion  of wrist in all planes x 10 repetitions    - Wrist wheel for Supination / Pronation stretch x 10 repetitions  - 35# Progressive Hand Gripper (black spring) for composite  x 20 repetitions   - Wooden pegboard with RED clothespins with 3PT pinch x 2 repetitions  - RED Theraputty for  PVC for "cookie cutting" and medicine top x 10  Repetitions       -NP = Not Performed     Initiate in future " "therapy sessions:      - ### Theraputty for pulling  - ### digiflex for isolated x 10 repetitions;  ### digiflex for composite digital flexion x 20 repetitions    - YELLOW digiweb for composite digital Extension and  intrinsics x 20 repetitions     - Low load prolonged stretches for Dorsiflexion /Volar Flexion with ### leg weight  ( on wedge) x 30 seconds each x 2 repetitions    - ### Wrist exerstick in standing for Dorsiflexion / Volar Flexion  x 3 repetitions     - Wrist wheel for Radial Deviation / Ulnar Deviation stretches x 10 repetitions         Assessment     Patient will continue to benefit from skilled Occupational Therapy intervention to increase functional abilities, range of motion, and strength and pain control.  Patient. demonstrated proper understanding of each exercise.  Patient continues to require verbal and tactile cues for throughout therapy session to maintain position and prevent compensation.   Patient continues to be limited in functional and leisurely pursuits. Pain limits patient's participation in activities of daily living.  Patient is not able to carryout necessary vocational tasks.   Patient's spiritual, cultural and educational needs considered and patient agreeable to plan of care and goals.  Patient is making good progress towards established goals.  Patient tolerated exercises / activities within pain tolerance.   Reviewed Home Exercise Program with patient., see EPIC under "patient instructions" for provided exercises, activity modifications and limitations, modalities for home pain management.  Patient. demo understanding of above.    Patient. tolerated Fluidotherapy  with no irritation.     Patient tolerated increase in resistance during ergometer; addition of  Theraputty with PVC for "cookie cutting" and medicine top; Flexbar for simulating turning door knob  with no reported pain.        New/Revised Goals: Continue Plan Of Care   Goals:  1)   Patient to be Independent with Home " exercise program and modalities for pain management by 1 week. (MET)   2)   Patient will report   0/10 (Right) wrist & 1/10 (Right) elbow pain on average with activity to assist with exercises by 6-8 weeks.( Revised 01/19/2024)   3)   Patient will increase range of Motion by 3-5 degrees to increase functional use for activities of daily living by 6-8 weeks.( In Progress)   4)   Patient will increase manual muscle testing by a grade to assist with lifting items by 6-8 weeks.   ( In Progress)   5)   Patient will increase  strength 3-5 pounds to open containers by 6-8 weeks.  ( In Progress)   6)   Patient will increase pinch by 1-3 pounds for buttoning by 6-8 weeks. ( In Progress)     Plan      Continue 2x week during the 90 day certification period    12/27/2023   to 03/27/2024   with established Plan Of Care in pursuit of Occupational Therapy goals.      Jacquelyn Sandra, OT

## 2024-01-24 ENCOUNTER — CLINICAL SUPPORT (OUTPATIENT)
Dept: REHABILITATION | Facility: HOSPITAL | Age: 44
End: 2024-01-24
Payer: OTHER GOVERNMENT

## 2024-01-24 DIAGNOSIS — M25.531 RIGHT WRIST PAIN: ICD-10-CM

## 2024-01-24 DIAGNOSIS — M25.521 RIGHT ELBOW PAIN: Primary | ICD-10-CM

## 2024-01-24 PROCEDURE — 97110 THERAPEUTIC EXERCISES: CPT | Mod: PN

## 2024-01-24 PROCEDURE — 97022 WHIRLPOOL THERAPY: CPT | Mod: PN

## 2024-01-24 PROCEDURE — 97530 THERAPEUTIC ACTIVITIES: CPT | Mod: PN

## 2024-01-25 ENCOUNTER — OFFICE VISIT (OUTPATIENT)
Dept: PULMONOLOGY | Facility: CLINIC | Age: 44
End: 2024-01-25
Payer: OTHER GOVERNMENT

## 2024-01-25 VITALS
HEART RATE: 90 BPM | SYSTOLIC BLOOD PRESSURE: 126 MMHG | WEIGHT: 247.94 LBS | HEIGHT: 73 IN | DIASTOLIC BLOOD PRESSURE: 96 MMHG | BODY MASS INDEX: 32.86 KG/M2 | OXYGEN SATURATION: 93 %

## 2024-01-25 DIAGNOSIS — J84.10 CALCIFIED GRANULOMA OF LUNG: ICD-10-CM

## 2024-01-25 DIAGNOSIS — J45.40 MODERATE PERSISTENT ASTHMA WITHOUT COMPLICATION: Primary | ICD-10-CM

## 2024-01-25 PROCEDURE — 99999 PR PBB SHADOW E&M-EST. PATIENT-LVL IV: CPT | Mod: PBBFAC,,, | Performed by: INTERNAL MEDICINE

## 2024-01-25 PROCEDURE — 99214 OFFICE O/P EST MOD 30 MIN: CPT | Mod: PBBFAC,PO | Performed by: INTERNAL MEDICINE

## 2024-01-25 PROCEDURE — 99214 OFFICE O/P EST MOD 30 MIN: CPT | Mod: S$PBB,,, | Performed by: INTERNAL MEDICINE
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
## 2024-01-25 NOTE — PATIENT INSTRUCTIONS
Continue advair inhaler  Albuterol as needed  Call if need a sooner visit or having flare of asthma symptoms

## 2024-02-10 ENCOUNTER — OFFICE VISIT (OUTPATIENT)
Dept: URGENT CARE | Facility: CLINIC | Age: 44
End: 2024-02-10
Payer: OTHER GOVERNMENT

## 2024-02-10 VITALS
WEIGHT: 247.81 LBS | HEIGHT: 73 IN | OXYGEN SATURATION: 96 % | HEART RATE: 86 BPM | BODY MASS INDEX: 32.84 KG/M2 | DIASTOLIC BLOOD PRESSURE: 93 MMHG | SYSTOLIC BLOOD PRESSURE: 133 MMHG | TEMPERATURE: 99 F | RESPIRATION RATE: 18 BRPM

## 2024-02-10 DIAGNOSIS — R50.9 FEVER, UNSPECIFIED FEVER CAUSE: ICD-10-CM

## 2024-02-10 DIAGNOSIS — R23.8 SKIN IRRITATION: ICD-10-CM

## 2024-02-10 DIAGNOSIS — R05.1 ACUTE COUGH: Primary | ICD-10-CM

## 2024-02-10 PROCEDURE — 99214 OFFICE O/P EST MOD 30 MIN: CPT | Mod: S$GLB,,, | Performed by: NURSE PRACTITIONER

## 2024-02-10 PROCEDURE — 87804 INFLUENZA ASSAY W/OPTIC: CPT | Mod: QW,,, | Performed by: NURSE PRACTITIONER

## 2024-02-10 PROCEDURE — 87811 SARS-COV-2 COVID19 W/OPTIC: CPT | Mod: QW,S$GLB,, | Performed by: NURSE PRACTITIONER

## 2024-02-10 RX ORDER — AZELASTINE 1 MG/ML
1 SPRAY, METERED NASAL 2 TIMES DAILY
Qty: 30 ML | Refills: 2 | Status: SHIPPED | OUTPATIENT
Start: 2024-02-10 | End: 2025-02-09

## 2024-02-10 RX ORDER — FLUTICASONE PROPIONATE 50 MCG
2 SPRAY, SUSPENSION (ML) NASAL DAILY
Qty: 16 G | Refills: 1 | Status: SHIPPED | OUTPATIENT
Start: 2024-02-10 | End: 2024-03-11

## 2024-02-10 RX ORDER — AMOXICILLIN 500 MG/1
500 CAPSULE ORAL 3 TIMES DAILY
Qty: 21 CAPSULE | Refills: 0 | Status: SHIPPED | OUTPATIENT
Start: 2024-02-10 | End: 2024-02-17

## 2024-02-10 NOTE — PROGRESS NOTES
"Subjective:      Patient ID: Jinny Rosado is a 44 y.o. male.    Vitals:  height is 6' 1" (1.854 m) and weight is 112.4 kg (247 lb 12.8 oz). His oral temperature is 98.6 °F (37 °C). His blood pressure is 133/93 (abnormal) and his pulse is 86. His respiration is 18 and oxygen saturation is 96%.     Chief Complaint: Cough, Nasal Congestion, and Sore Throat    Pt states that his symptoms started 3/4 days ago. Patient states that his symptoms are the following: nasal congestion, cough w/ mucus, body ache, slight sore throat, post nasal drip, slight heaviness/tightness in chest. Hx of pneumonia in July. Pt states that he has taken the following to treat: mucinex cough and cold daytime and night time, afrin, ibuprofen last night was last dose of ay medications. Pt would like to see the provider before being swabbed.    He has had low grade fever. No nvd.       ROS   Objective:     Physical Exam   HENT:   Head: Normocephalic and atraumatic.   Ears:   Right Ear: Tympanic membrane normal.   Left Ear: Tympanic membrane normal.   Nose: Nose normal.   Mouth/Throat: Mucous membranes are moist. Oropharynx is clear.   Eyes: Conjunctivae are normal. Pupils are equal, round, and reactive to light.   Neck: Neck supple.   Cardiovascular: Normal rate, regular rhythm, normal heart sounds and normal pulses.   Abdominal: Normal appearance.   Neurological: He is alert.   Skin: Skin is rash (corner of eyes).   Vitals reviewed.      Assessment:     1. Acute cough    2. Fever, unspecified fever cause    3. Skin irritation        Plan:       Acute cough  -     SARS Coronavirus 2 Antigen, POCT Manual Read  -     POCT Influenza A/B    Fever, unspecified fever cause    Skin irritation    Other orders  -     tobramycin-dexAMETHasone 0.3-0.1% (TOBRADEX) 0.3-0.1 % Oint; Place into both eyes 2 (two) times daily. Apply to lower lid and corner of eye  Dispense: 7 g; Refill: 0  -     amoxicillin (AMOXIL) 500 MG capsule; Take 1 capsule (500 mg " total) by mouth 3 (three) times daily. for 7 days  Dispense: 21 capsule; Refill: 0  -     fluticasone propionate (FLONASE) 50 mcg/actuation nasal spray; 2 sprays (100 mcg total) by Each Nostril route once daily.  Dispense: 16 g; Refill: 1  -     azelastine (ASTELIN) 137 mcg (0.1 %) nasal spray; 1 spray (137 mcg total) by Nasal route 2 (two) times daily.  Dispense: 30 mL; Refill: 2                  Sinusitis. Stop afrin.   Patient Instructions   Saline nasal spray 3 times a day each nostril.    Meds as above.  Covid flu negative.

## 2024-02-16 ENCOUNTER — LAB VISIT (OUTPATIENT)
Dept: LAB | Facility: HOSPITAL | Age: 44
End: 2024-02-16
Attending: INTERNAL MEDICINE
Payer: OTHER GOVERNMENT

## 2024-02-16 DIAGNOSIS — E89.0 POSTOPERATIVE HYPOTHYROIDISM: ICD-10-CM

## 2024-02-16 LAB
T4 FREE SERPL-MCNC: 1.11 NG/DL (ref 0.71–1.51)
TSH SERPL DL<=0.005 MIU/L-ACNC: 1.35 UIU/ML (ref 0.4–4)

## 2024-02-16 PROCEDURE — 84443 ASSAY THYROID STIM HORMONE: CPT | Performed by: INTERNAL MEDICINE

## 2024-02-16 PROCEDURE — 84439 ASSAY OF FREE THYROXINE: CPT | Performed by: INTERNAL MEDICINE

## 2024-02-16 PROCEDURE — 36415 COLL VENOUS BLD VENIPUNCTURE: CPT | Performed by: INTERNAL MEDICINE

## 2024-02-26 ENCOUNTER — OFFICE VISIT (OUTPATIENT)
Dept: NEUROSURGERY | Facility: CLINIC | Age: 44
End: 2024-02-26
Payer: OTHER GOVERNMENT

## 2024-02-26 ENCOUNTER — PATIENT MESSAGE (OUTPATIENT)
Dept: ENDOCRINOLOGY | Facility: CLINIC | Age: 44
End: 2024-02-26
Payer: OTHER GOVERNMENT

## 2024-02-26 VITALS
SYSTOLIC BLOOD PRESSURE: 126 MMHG | BODY MASS INDEX: 32.74 KG/M2 | WEIGHT: 247 LBS | HEIGHT: 73 IN | HEART RATE: 82 BPM | DIASTOLIC BLOOD PRESSURE: 92 MMHG

## 2024-02-26 DIAGNOSIS — M50.30 DDD (DEGENERATIVE DISC DISEASE), CERVICAL: Primary | ICD-10-CM

## 2024-02-26 DIAGNOSIS — E89.0 POSTOPERATIVE HYPOTHYROIDISM: Primary | ICD-10-CM

## 2024-02-26 DIAGNOSIS — M54.12 CERVICAL RADICULOPATHY: ICD-10-CM

## 2024-02-26 PROCEDURE — 99215 OFFICE O/P EST HI 40 MIN: CPT | Mod: S$PBB,,, | Performed by: NEUROLOGICAL SURGERY

## 2024-02-26 RX ORDER — LEVOTHYROXINE SODIUM 100 UG/1
200 TABLET ORAL
Qty: 180 TABLET | Refills: 3 | Status: SHIPPED | OUTPATIENT
Start: 2024-02-26

## 2024-02-26 NOTE — PROGRESS NOTES
HPI 10/12/2023:  This is a very pleasant 43-year-old gentleman presents with posterior neck pain with radiation to the right shoulder, arm,.  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  Consequently he reports that he is had off and on neck pain for the past 20 years.  However, over the last several months the neck and right arm pain have progressively worsened.  He describes constant, dull aching pain in the lower posterior cervical region with radiation into the right shoulder, anterior lateral arm, diffusely in the right forearm and hand.  He reports diffuse bilateral hand numbness, right-greater-than-left.  He reports subjective right arm weakness.  He denies dropping objects.  He denies hand incoordination, imbalance, sphincter dysfunction.  EMG nerve conduction study July 6, 2023 was reviewed.  There is evidence moderate right median neuropathy, mild left median neuropathy, mild ulnar neuropathy, and mild right C7 radiculopathy.  He is scheduled for a right ulnar and carpal tunnel release with Dr. Perez November 3rd.  He is also scheduled for a C7-T1 RAYSHAWN with Dr. Luna October 13, 2023.  He reports the neck and right arm pain are packing his quality of life.  He is anxious for definitive resolution.       MRI and thyroid ultrasound demonstrated nodules.  He is scheduled for a thyroid biopsy.     Smoking status:  Former, quit 2008    Interval history 02/26/2024:  He follows up with ongoing symptoms as above.  He is interested in pursuing cervical spine surgery.  Since last evaluation he underwent a total thyroidectomy.  Pathology was negative for malignancy.  In addition to above symptoms, he reports intermittent left anterior shoulder pain and worsening axial neck pain today.  Denies myelopathy symptoms.    Past Medical History:   Diagnosis Date    Ankylosing spondylitis of unspecified sites in spine     Carpal tunnel syndrome on both sides      Sleep apnea     Tarsal tunnel syndrome, bilateral      Past Surgical History:   Procedure Laterality Date    ENDOSCOPIC CARPAL TUNNEL RELEASE Right 2023    Procedure: RELEASE, CARPAL TUNNEL, ENDOSCOPIC - RIGHT;  Surgeon: Hilton Perez MD;  Location: [facility] [place] OR;  Service: Orthopedics;  Laterality: Right;    EPIDURAL STEROID INJECTION INTO CERVICAL SPINE N/A 10/13/2023    Procedure: Injection-steroid-epidural-cervical;  Surgeon: Arpit Luna MD;  Location: [facility] OR;  Service: Anesthesiology;  Laterality: N/A;  c7-t1    left knee surgery      RELEASE OF ULNAR NERVE AT CUBITAL TUNNEL Right 2023    Procedure: RELEASE, ULNAR TUNNEL - RIGHT;  Surgeon: Hilton Perez MD;  Location: [facility] [place] OR;  Service: Orthopedics;  Laterality: Right;    RHINOPLASTY      right foot surgery      TOTAL THYROIDECTOMY  2023    VASECTOMY       Social History     Socioeconomic History    Marital status:    Tobacco Use    Smoking status: Former     Current packs/day: 0.00     Types: Cigarettes     Quit date:      Years since quittin.1    Smokeless tobacco: Never   Substance and Sexual Activity    Alcohol use: Yes     Comment: occ.    Drug use: Not Currently     Review of Systems: All systems reviewed and are as above or otherwise negative.     General: well developed, well nourished, no distress.   Head: normocephalic, atraumatic  Eyes: pupils equal, round, reactive to light with accomodation, EOMI.   Neck: trachea midline. No JVD  Cardiovascular: No LE edema   Pulmonary: normal respirations, no signs of respiratory distress  Abdomen: soft, non-distended, not tender to palpation  Sensory: intact to light touch throughout  Extremities: No bruising, deformity  Skin: Skin is warm, dry and intact.     Motor Strength: Moves all extremities spontaneously with good tone.  Full strength upper and lower extremities. No abnormal movements seen.      Strength   Deltoids Triceps Biceps Wrist Extension Wrist Flexion Hand     Upper: R 5/5 5/5 5/5 5/5 5/5 5/5     L 5/5 5/5 5/5 5/5 5/5 5/5       Iliopsoas Quadriceps Knee  Flexion Tibialis  anterior Gastro- cnemius EHL   Lower: R 5/5 5/5 5/5 5/5 5/5 5/5     L 5/5 5/5 5/5 5/5 5/5 5/5      Neurologic: Alert and oriented. Thought content appropriate.  GCS: Motor: 6/Verbal: 5/Eyes: 4 GCS Total: 15  Mental Status: Awake, Alert, Oriented x 4  Language: No aphasia  Speech: No dysarthria  Cranial nerves: face symmetric, tongue midline, CN II-XII grossly intact.      Pronator Drift: no drift noted  Finger-to-nose: Intact bilaterally  DTR's: 2 + and symmetric in UE and LE  Krishnamurthy: absent  Clonus: absent  Babinski: absent     Gait: normal                  Tandem Gait: No difficulty                Able to walk on heels & toes     Cervical Spine  ROM: full                       Nontender to palpation     Lumbar Spine   ROM: full           Nontender to palpation     Pain on Hip ROM: Negative  Straight leg raise: negative bilaterally      SI Joint tenderness: Negative bilaterally          enslen: Negative bilaterally     Significant Exam Findings:  Motor and sensory intact.  Absent Cliff sign.  Tender to palpation posterior cervical region.  Right rotation and extension exacerbate pain.  Well healed midline lower anterior cervical incision.     Significant Radiology Findings:  I again personally reviewed MRI cervical spine with the patient and his wife.  Date of study 09/01/2023.  Pertinent findings include multilevel mild-to-moderate degenerative disc degeneration with disc desiccation and mild disc height loss, loss of cervical lordosis, moderate to severe left foraminal stenosis C4-5, severe foraminal stenosis C5-6, no foraminal stenosis C6-7, no significant central stenosis, no cord signal change     Analysis:  The cause of his symptoms is multifactorial including cervical radiculopathy and cervical axial neck pain, likely due to the disc degeneration with foraminal stenosis at C5-6 and  C4-5, as well as both ulnar and median neuropathy. He is neurologically intact without myelopathy signs.  I outlined nonoperative and operative treatment options.  He is inclined to proceed with operative management.  I offered ACDF C4-5, C5-6.  I described the procedure using anatomic model and MRI imaging.  I outlined the goals of surgery, material risks, and anticipated postoperative course.  He voiced understanding of all the above and elects to proceed with surgery.  I ordered CT cervical and dynamic cervical x-rays for surgical planning.  He was counseled to stop Motrin and all other NSAIDs 7 days prior to his date of surgery.    Jinny was seen today for follow-up.    Diagnoses and all orders for this visit:    DDD (degenerative disc disease), cervical    Cervical radiculopathy      I spent a total of 40 minutes on the day of the visit.  This includes face to face time and non-face to face time preparing to see the patient (eg, review of tests), obtaining and/or reviewing separately obtained history, documenting clinical information in the electronic or other health record, independently interpreting results and communicating results to the patient/family/caregiver, or care coordinator.

## 2024-02-27 ENCOUNTER — DOCUMENTATION ONLY (OUTPATIENT)
Dept: REHABILITATION | Facility: HOSPITAL | Age: 44
End: 2024-02-27
Payer: OTHER GOVERNMENT

## 2024-02-27 NOTE — PROGRESS NOTES
Outpatient Therapy Discharge Summary       Date: 2/27/2024  Name: Jinny HEREDIA [facility] Number: [id number]    Therapy Diagnosis: G56.01 (ICD-10-CM) - Right carpal tunnel syndrome, G56.21 (ICD-10-CM) - Cubital tunnel syndrome on right, Z98.890 (ICD-10-CM) - Postoperative state  Physician: Russo-Digeorge, Jamie L     Physician Orders: G56.01 (ICD-10-CM) - Right carpal tunnel syndrome, G56.21 (ICD-10-CM) - Cubital tunnel syndrome on right, Z98.890 (ICD-10-CM) - Postoperative state; evaluate and treat  Medical Diagnosis: G56.01 (ICD-10-CM) - Right carpal tunnel syndrome, G56.21 (ICD-10-CM) - Cubital tunnel syndrome on right, Z98.890 (ICD-10-CM) - Postoperative state  Evaluation Date: 12/27/2023      Date of Last visit: 01/24/2024  Total Visits Received: 6  Cancelled Visits: 2  No Show Visits: 0    Assessment    Goals:     Patient's therapy goals were formally reassessed on 01/19/2024 and was working toward established goals.    Patient's treatment included     therapeutic exercise/activities,   modalities for pain management,   Range of Motion and strengthening    Plan     Patient is discharged from outpatient occupational therapy secondary to self discharge / patient having surgery.

## 2024-05-09 ENCOUNTER — TELEPHONE (OUTPATIENT)
Dept: NEUROSURGERY | Facility: CLINIC | Age: 44
End: 2024-05-09
Payer: OTHER GOVERNMENT

## 2024-05-09 DIAGNOSIS — Z01.818 PRE-OP EXAM: ICD-10-CM

## 2024-05-09 DIAGNOSIS — M50.30 DDD (DEGENERATIVE DISC DISEASE), CERVICAL: Primary | ICD-10-CM

## 2024-05-09 RX ORDER — CEFAZOLIN SODIUM 2 G/50ML
2 SOLUTION INTRAVENOUS
Status: CANCELLED | OUTPATIENT
Start: 2024-05-09

## 2024-05-09 RX ORDER — MUPIROCIN 20 MG/G
1 OINTMENT TOPICAL 2 TIMES DAILY
Status: CANCELLED | OUTPATIENT
Start: 2024-05-09 | End: 2024-05-10

## 2024-05-09 RX ORDER — MUPIROCIN 20 MG/G
OINTMENT TOPICAL
Status: CANCELLED | OUTPATIENT
Start: 2024-05-09

## 2024-05-09 NOTE — TELEPHONE ENCOUNTER
Returned call to pt. He reports due to his work schedule, he is unable to proceed with surgery at this time. Pt reports that he will contact our office when he is able to reschedule.

## 2024-05-09 NOTE — TELEPHONE ENCOUNTER
----- Message from Helen Hawk RN sent at 5/9/2024 11:22 AM CDT -----  Please contact pt and provide preop, ct, and xray appt details. Also, please request pt come to our clinic on a Mon or Thurs to sign surgery consent and  surgery paperwork. Thank you.

## 2024-05-09 NOTE — TELEPHONE ENCOUNTER
----- Message from Stacey M Lefort sent at 5/9/2024 11:29 AM CDT -----  Pt needs a callback about cancelling his surgery and rescheduling. His callback is  [phone]   SW Discharge Planning     SW noted baby's cordstat to be negative for all tested substances     Electronically signed by IVORY Gambino on 2022 at 11:16 AM

## 2024-06-14 ENCOUNTER — TELEPHONE (OUTPATIENT)
Dept: PODIATRY | Facility: CLINIC | Age: 44
End: 2024-06-14
Payer: OTHER GOVERNMENT

## 2024-07-08 ENCOUNTER — OFFICE VISIT (OUTPATIENT)
Dept: PODIATRY | Facility: CLINIC | Age: 44
End: 2024-07-08
Payer: OTHER GOVERNMENT

## 2024-07-08 VITALS — BODY MASS INDEX: 33.78 KG/M2 | WEIGHT: 254.88 LBS | HEIGHT: 73 IN | OXYGEN SATURATION: 99 %

## 2024-07-08 DIAGNOSIS — M21.622 TAILOR'S BUNION OF BOTH FEET: ICD-10-CM

## 2024-07-08 DIAGNOSIS — M21.621 TAILOR'S BUNION OF BOTH FEET: ICD-10-CM

## 2024-07-08 DIAGNOSIS — M72.2 PLANTAR FASCIITIS: Primary | ICD-10-CM

## 2024-07-08 DIAGNOSIS — M79.671 RIGHT FOOT PAIN: ICD-10-CM

## 2024-07-08 PROCEDURE — 20550 NJX 1 TENDON SHEATH/LIGAMENT: CPT | Mod: S$PBB,RT,, | Performed by: PODIATRIST

## 2024-07-08 PROCEDURE — 99999 PR PBB SHADOW E&M-EST. PATIENT-LVL III: CPT | Mod: PBBFAC,,, | Performed by: PODIATRIST

## 2024-07-08 PROCEDURE — 99203 OFFICE O/P NEW LOW 30 MIN: CPT | Mod: 25,S$PBB,, | Performed by: PODIATRIST

## 2024-07-08 PROCEDURE — 99213 OFFICE O/P EST LOW 20 MIN: CPT | Mod: PBBFAC,PN,25 | Performed by: PODIATRIST

## 2024-07-08 PROCEDURE — 20550 NJX 1 TENDON SHEATH/LIGAMENT: CPT | Mod: PBBFAC,PN | Performed by: PODIATRIST

## 2024-07-08 PROCEDURE — 99999PBSHW PR PBB SHADOW TECHNICAL ONLY FILED TO HB: Mod: PBBFAC,,,

## 2024-07-08 RX ORDER — DEXAMETHASONE SODIUM PHOSPHATE 4 MG/ML
4 INJECTION, SOLUTION INTRA-ARTICULAR; INTRALESIONAL; INTRAMUSCULAR; INTRAVENOUS; SOFT TISSUE
Status: COMPLETED | OUTPATIENT
Start: 2024-07-08 | End: 2024-07-08

## 2024-07-08 RX ORDER — METHYLPREDNISOLONE ACETATE 40 MG/ML
20 INJECTION, SUSPENSION INTRA-ARTICULAR; INTRALESIONAL; INTRAMUSCULAR; SOFT TISSUE
Status: COMPLETED | OUTPATIENT
Start: 2024-07-08 | End: 2024-07-08

## 2024-07-08 RX ORDER — BUPIVACAINE HYDROCHLORIDE 5 MG/ML
1.5 INJECTION, SOLUTION PERINEURAL
Status: COMPLETED | OUTPATIENT
Start: 2024-07-08 | End: 2024-07-08

## 2024-07-08 RX ADMIN — BUPIVACAINE HYDROCHLORIDE 7.5 MG: 5 INJECTION, SOLUTION PERINEURAL at 01:07

## 2024-07-08 RX ADMIN — METHYLPREDNISOLONE ACETATE 20 MG: 40 INJECTION, SUSPENSION INTRA-ARTICULAR; INTRALESIONAL; INTRAMUSCULAR; INTRASYNOVIAL; SOFT TISSUE at 01:07

## 2024-07-08 RX ADMIN — DEXAMETHASONE SODIUM PHOSPHATE 4 MG: 4 INJECTION INTRA-ARTICULAR; INTRALESIONAL; INTRAMUSCULAR; INTRAVENOUS; SOFT TISSUE at 01:07

## 2024-07-08 NOTE — PROGRESS NOTES
"  [address] [facility] [address]  [place] [address]  Phone: [phone]   [phone]    Patient's PCP:Darlyn, Primary Doctor  Referring Provider: Aaareferral Self    Subjective:      Chief Complaint:: Heel Pain (RT foot heel pain)    ADRIANNE Rosado is a 44 y.o. male who presents today with a complaint of Heel Pain RT foot heel pain. The current episode started about 3 months.  The symptoms include sharp, stabbing, aching, throbbing pain. Probable cause of complaint unknown.  The symptoms are aggravated by walking, standing, first few steps. The problem has worsened. Treatment to date have included stretching, OTC pain medication, insoles which provided no relief. PT has a history of plantar faciitis.         Vitals:    07/08/24 0858   SpO2: 99%   Weight: 115.6 kg (254 lb 13.6 oz)   Height: 6' 1" (1.854 m)   PainSc:   8      Shoe Size: 12    Past Surgical History:   Procedure Laterality Date    ENDOSCOPIC CARPAL TUNNEL RELEASE Right 11/03/2023    Procedure: RELEASE, CARPAL TUNNEL, ENDOSCOPIC - RIGHT;  Surgeon: Hilton Perez MD;  Location: [facility] OR;  Service: Orthopedics;  Laterality: Right;    EPIDURAL STEROID INJECTION INTO CERVICAL SPINE N/A 10/13/2023    Procedure: Injection-steroid-epidural-cervical;  Surgeon: Arpit Luna MD;  Location: [facility] OR;  Service: Anesthesiology;  Laterality: N/A;  c7-t1    left knee surgery      RELEASE OF ULNAR NERVE AT CUBITAL TUNNEL Right 11/03/2023    Procedure: RELEASE, ULNAR TUNNEL - RIGHT;  Surgeon: Hilton Perez MD;  Location: [facility] OR;  Service: Orthopedics;  Laterality: Right;    RHINOPLASTY      right foot surgery      TOTAL THYROIDECTOMY  12/07/2023    VASECTOMY       Past Medical History:   Diagnosis Date    Ankylosing spondylitis of unspecified sites in spine     Carpal tunnel syndrome on both sides     Sleep apnea     Tarsal tunnel syndrome, bilateral      Family History   Problem Relation Name Age of Onset    COPD Mother      Asthma Mother      " Arthritis Mother      COPD Father      Thyroid cancer Father      Lung cancer Father      Stroke Sister      Asthma Sister      Breast cancer Maternal Grandmother      Lung cancer Paternal Grandmother      Lung cancer Paternal Grandfather          Social History:   Marital Status:   Alcohol History:  reports current alcohol use.  Tobacco History:  reports that he quit smoking about 16 years ago. His smoking use included cigarettes. He has never used smokeless tobacco.  Drug History:  reports that he does not currently use drugs.    Review of patient's allergies indicates:   Allergen Reactions    Grass pollen-june grass standard Other (See Comments)    Grass pollen      Other Reaction(s): Nasal congestion (finding), Pruritic rash (disorder)       Current Outpatient Medications   Medication Sig Dispense Refill    acetaminophen (TYLENOL) 500 MG tablet Take 2 tablets (1,000 mg total) by mouth 3 (three) times daily. 20 tablet 0    albuterol (PROVENTIL/VENTOLIN HFA) 90 mcg/actuation inhaler Inhale 1-2 puffs into the lungs every 4 (four) hours as needed for Shortness of Breath (coughing). Rescue 18 g 11    azelastine (ASTELIN) 137 mcg (0.1 %) nasal spray 1 spray (137 mcg total) by Nasal route 2 (two) times daily. 30 mL 2    fexofenadine (ALLEGRA) 180 MG tablet Take 1 tablet by mouth every morning.      fluticasone-salmeterol diskus inhaler 250-50 mcg Inhale 1 puff into the lungs 2 (two) times daily. Controller 60 each 11     mg tablet Take 800 mg by mouth 3 (three) times daily.      levothyroxine (SYNTHROID) 100 MCG tablet Take 2 tablets (200 mcg total) by mouth before breakfast. 180 tablet 3    MAGNESIUM CARBONATE ORAL Take by mouth.      MULTIVITAMIN ORAL Take by mouth.       Current Facility-Administered Medications   Medication Dose Route Frequency Provider Last Rate Last Admin    albuterol sulfate nebulizer solution 2.5 mg  2.5 mg Nebulization 1 time in Clinic/HOD Kimberlyn Dacosta MD          Facility-Administered Medications Ordered in Other Visits   Medication Dose Route Frequency Provider Last Rate Last Admin    lactated ringers infusion   Intravenous Continuous VuArpit MD 25 mL/hr at 10/13/23 1030 New Bag at 10/13/23 1030       Review of Systems   Constitutional:  Negative for chills, fatigue, fever and unexpected weight change.   HENT:  Negative for hearing loss and trouble swallowing.    Eyes:  Negative for photophobia and visual disturbance.   Respiratory:  Negative for cough, shortness of breath and wheezing.    Cardiovascular:  Negative for chest pain, palpitations and leg swelling.   Gastrointestinal:  Negative for abdominal pain and nausea.   Genitourinary:  Negative for dysuria and frequency.   Musculoskeletal:  Negative for arthralgias, back pain, gait problem, joint swelling, myalgias and neck pain.   Skin:  Negative for rash and wound.   Neurological:  Negative for tremors, seizures, weakness, numbness and headaches.   Hematological:  Does not bruise/bleed easily.   Psychiatric/Behavioral:  Negative for hallucinations.          Objective:        Physical Exam:   Foot Exam    General  General Appearance: appears stated age and healthy   Orientation: alert and oriented to person, place, and time   Affect: appropriate   Gait: antalgic       Right Foot/Ankle     Inspection and Palpation  Ecchymosis: none  Tenderness: plantar fascia (5th metatarsal head)  Swelling: none   Arch: normal  Skin Exam: skin intact; no drainage, no ulcer and no erythema   Neurovascular  Dorsalis pedis: 2+  Posterior tibial: 2+  Capillary Refill: 2+  Varicose veins: not present  Saphenous nerve sensation: normal  Tibial nerve sensation: normal  Superficial peroneal nerve sensation: normal  Deep peroneal nerve sensation: normal  Sural nerve sensation: normal    Edema  Type of edema: non-pitting    Muscle Strength  Ankle dorsiflexion: 5  Ankle plantar flexion: 5  Ankle inversion: 5  Ankle eversion: 5  Great toe  extension: 5  Great toe flexion: 5    Range of Motion    Passive  Ankle dorsiflexion: 5      Tests  Anterior drawer: negative   Talar tilt: negative   PT Tinel's sign: negative    Paresthesia: negative  Comments  Pain on palpation of the infeior medial heel and central plantar heel. No pain present  with side to side compression of the calcaneus. Negative tinnel's sign  at the tarsal tunnel. Negative Johnson's nerve pain. Negative Calcaneal nerve pain. No soft tissue masses. Pain absent  with dorsiflexion of the ankle. No edema, erythema, or ecchymosis noted.     Mild tailor's bunion        Physical Exam  Cardiovascular:      Pulses:           Dorsalis pedis pulses are 2+ on the right side.        Posterior tibial pulses are 2+ on the right side.   Feet:      Right foot:      Skin integrity: No ulcer or erythema.               Right Ankle/Foot Exam     Comments:  Pain on palpation of the infeior medial heel and central plantar heel. No pain present  with side to side compression of the calcaneus. Negative tinnel's sign  at the tarsal tunnel. Negative Johnson's nerve pain. Negative Calcaneal nerve pain. No soft tissue masses. Pain absent  with dorsiflexion of the ankle. No edema, erythema, or ecchymosis noted.     Mild tailor's bunion        Muscle Strength   Right Lower Extremity   Ankle Dorsiflexion:  5   Plantar flexion:  5/5    Vascular Exam     Right Pulses  Dorsalis Pedis:      2+  Posterior Tibial:      2+           Imaging: none            Assessment:       1. Plantar fasciitis    2. Right foot pain    3. Tailor's bunion of both feet      Plan:   Plantar fasciitis  -     methylPREDNISolone acetate injection 20 mg  -     BUPivacaine injection 7.5 mg  -     dexAMETHasone injection 4 mg    Right foot pain  -     methylPREDNISolone acetate injection 20 mg  -     BUPivacaine injection 7.5 mg  -     dexAMETHasone injection 4 mg    Tailor's bunion of both feet      Follow up if symptoms worsen or fail to  improve.    Discussed different treatment options for heel pain. I gave written and verbal instructions on heel cord stretching and this was demonstrated for the patient. Patient expressed understanding. Discussed wearing appropriate shoe gear and avoiding flats, slippers, sandals, barefoot, and sockfeet. Recommended arch supports. My recommendation for OTC supports is Spenco polysorb replacement insoles or patient may elect more aggressive treatment with prescription arch supports. We also discussed cortisone injections and NSAID therapy.     I am going to order an updated pair of custom orthotics for him    Procedures Informed consent was obtained.  Time-out was called.  The area was prepped with alcohol, and a steroid injection was performed at right plantar fascia using 1.5 cc of 0.5% Marcaine w/out epi, 1 cc Dexamethasone, 0.5 cc Methylprednisolone. Patient tolerated the procedure well.             Counseling:     I provided patient education verbally regarding:   Patient diagnosis, treatment options, as well as alternatives, risks, and benefits.     This note was created using Dragon voice recognition software that occasionally misinterpreted phrases or words.

## 2024-07-08 NOTE — PATIENT INSTRUCTIONS

## 2024-08-01 ENCOUNTER — OFFICE VISIT (OUTPATIENT)
Dept: URGENT CARE | Facility: CLINIC | Age: 44
End: 2024-08-01
Payer: OTHER GOVERNMENT

## 2024-08-01 VITALS
OXYGEN SATURATION: 99 % | HEIGHT: 73 IN | DIASTOLIC BLOOD PRESSURE: 91 MMHG | SYSTOLIC BLOOD PRESSURE: 123 MMHG | HEART RATE: 87 BPM | WEIGHT: 255 LBS | RESPIRATION RATE: 16 BRPM | BODY MASS INDEX: 33.8 KG/M2 | TEMPERATURE: 99 F

## 2024-08-01 DIAGNOSIS — L02.412 ABSCESS OF LEFT AXILLA: Primary | ICD-10-CM

## 2024-08-01 PROCEDURE — 99213 OFFICE O/P EST LOW 20 MIN: CPT | Mod: S$GLB,,, | Performed by: NURSE PRACTITIONER

## 2024-08-01 RX ORDER — MUPIROCIN 20 MG/G
OINTMENT TOPICAL 3 TIMES DAILY
Qty: 22 G | Refills: 0 | Status: SHIPPED | OUTPATIENT
Start: 2024-08-01 | End: 2024-08-08

## 2024-08-01 RX ORDER — DOXYCYCLINE 100 MG/1
100 CAPSULE ORAL EVERY 12 HOURS
Qty: 20 CAPSULE | Refills: 0 | Status: SHIPPED | OUTPATIENT
Start: 2024-08-01 | End: 2024-08-11

## 2024-08-01 RX ORDER — HYDROCODONE BITARTRATE AND ACETAMINOPHEN 7.5; 325 MG/1; MG/1
1 TABLET ORAL EVERY 8 HOURS PRN
Qty: 9 TABLET | Refills: 0 | Status: SHIPPED | OUTPATIENT
Start: 2024-08-01 | End: 2024-08-04

## 2024-08-01 NOTE — PATIENT INSTRUCTIONS
Keep dressing clean dry and intact until follow-up appointment  If dressing is removed may apply mupirocin ointment and cover with Telfa nonstick dressing and paper tape or adhesive bandage  Take doxycycline twice daily as prescribed.  Take this to completion.  Take each dose with a large glass of water and do not lie flat for 30 minute  May take tylenol or motrin over the counter for pain.  May take Norco for breakthrough pain. Do not take tylenol and norco within 6 hours of each other.  A culture of the wound contents has been sent for evaluation, someone will contact you with these results if there is a need for antibiotic change  Do not get wound wet.  Do not submerge in water, do not allow contact with tap water.  May use saline or wound cleanser to clean the wound  Return to this clinic August 4, 2024 for re-evaluation and packing change  If you develop severe pain, foul odor drainage, severe redness or fever associated with this wound go immediately to the nearest ER

## 2024-08-01 NOTE — PROCEDURES
"Incision & Drainage    Date/Time: 8/1/2024 9:40 AM    Performed by: Timothy Peterson Jr., FNP-C  Authorized by: Timothy Peterson Jr., BENI-GREGORY    Time out: Immediately prior to procedure a "time out" was called to verify the correct patient, procedure, equipment, support staff and site/side marked as required.    Consent Done?:  Yes (Verbal)    Type:  Abscess  Body area:  Trunk (Left axilla)  Anesthesia:  Local infiltration  Local anesthetic: Lidocaine 1% without epinephrine  Anesthetic total (ml):  2  Risk factor:  Underlying major vessel, underlying major nerve and coagulopathy  Scalpel size:  11  Incision type:  Single straight  Incision depth: subcutaneous    Complexity:  Complex  Drainage:  Purulent and bloody  Drainage amount:  Moderate  Wound treatment:  Incision, drainage, expression of material and wound packed  Packing material:  1/4 in iodoform gauze  Patient tolerance:  Patient tolerated the procedure well with no immediate complications  Pain Assessment: 3    Bleeding controlled with direct pressure after procedure.  Wound then dressed with Telfa dressing, mupirocin ointment, and paper tape after procedure.    "

## 2024-08-01 NOTE — PROGRESS NOTES
"Subjective:      Patient ID: Jinny Rosado is a 44 y.o. male.    Vitals:  height is 6' 1" (1.854 m) and weight is 115.7 kg (255 lb). His temperature is 99 °F (37.2 °C). His blood pressure is 123/91 (abnormal) and his pulse is 87. His respiration is 16 and oxygen saturation is 99%.     Chief Complaint: Abscess    44-year-old male seen for painful raised lesion in the left armpit.  He states it began as a "pimple" 5 days ago and has slowly gotten larger and more painful.  He denies any fever or drainage.    Abscess  Associated Symptoms: no fever, no chills      Constitution: Negative for chills and fever.   HENT:  Negative for congestion.    Cardiovascular:  Negative for chest pain, palpitations and sob on exertion.   Respiratory:  Negative for shortness of breath.    Gastrointestinal:  Negative for nausea and vomiting.   Skin:  Positive for erythema and abscess. Negative for rash.   Neurological:  Negative for dizziness, light-headedness, passing out, disorientation and altered mental status.   Psychiatric/Behavioral:  Negative for altered mental status, disorientation and confusion.       Objective:     Physical Exam   Constitutional: He is oriented to person, place, and time. He appears well-developed. He is cooperative.  Non-toxic appearance. He does not appear ill. No distress.   HENT:   Head: Normocephalic and atraumatic.   Ears:   Right Ear: External ear normal.   Left Ear: External ear normal.   Nose: Nose normal.   Mouth/Throat: Oropharynx is clear and moist and mucous membranes are normal. Mucous membranes are moist.   Eyes: Conjunctivae and lids are normal. No scleral icterus.   Neck: Trachea normal and phonation normal. Neck supple.   Cardiovascular: Normal rate, regular rhythm, normal heart sounds and normal pulses.   Pulmonary/Chest: Effort normal and breath sounds normal. No stridor. No respiratory distress.   Abdominal: Normal appearance.   Musculoskeletal:         General: No deformity. "   Neurological: no focal deficit. He is alert and oriented to person, place, and time. He has normal strength and normal reflexes. No sensory deficit.   Skin: Skin is warm, dry, intact and not diaphoretic. Capillary refill takes 2 to 3 seconds. erythema        Psychiatric: His speech is normal and behavior is normal. Judgment and thought content normal.   Nursing note and vitals reviewed.      Assessment:     1. Abscess of left axilla        Plan:       Abscess of left axilla  -     doxycycline (VIBRAMYCIN) 100 MG Cap; Take 1 capsule (100 mg total) by mouth every 12 (twelve) hours. for 10 days  Dispense: 20 capsule; Refill: 0  -     mupirocin (BACTROBAN) 2 % ointment; Apply topically 3 (three) times daily. for 7 days  Dispense: 22 g; Refill: 0  -     HYDROcodone-acetaminophen (NORCO) 7.5-325 mg per tablet; Take 1 tablet by mouth every 8 (eight) hours as needed for Pain.  Dispense: 9 tablet; Refill: 0  -     CULTURE, AEROBIC  (SPECIFY SOURCE)  -     Incision & Drainage        The physical exam findings were discussed with the patient and all questions answered. We discussed symptom monitoring, conservative care methods, medication use, and follow up orders. he verbalized understanding and agreement with the plan of care.

## 2024-08-04 ENCOUNTER — OFFICE VISIT (OUTPATIENT)
Dept: URGENT CARE | Facility: CLINIC | Age: 44
End: 2024-08-04
Payer: OTHER GOVERNMENT

## 2024-08-04 VITALS
BODY MASS INDEX: 33.53 KG/M2 | DIASTOLIC BLOOD PRESSURE: 87 MMHG | RESPIRATION RATE: 16 BRPM | WEIGHT: 253 LBS | HEIGHT: 73 IN | OXYGEN SATURATION: 97 % | TEMPERATURE: 99 F | SYSTOLIC BLOOD PRESSURE: 127 MMHG | HEART RATE: 94 BPM

## 2024-08-04 DIAGNOSIS — Z51.89 WOUND CHECK, ABSCESS: ICD-10-CM

## 2024-08-04 DIAGNOSIS — L02.412 ABSCESS OF LEFT AXILLA: Primary | ICD-10-CM

## 2024-08-04 DIAGNOSIS — Z48.00 ENCOUNTER FOR CHANGE OF DRESSING: ICD-10-CM

## 2024-08-04 PROCEDURE — 99213 OFFICE O/P EST LOW 20 MIN: CPT | Mod: ,,,

## 2024-08-17 DIAGNOSIS — J45.991 COUGH VARIANT ASTHMA: ICD-10-CM

## 2024-08-19 RX ORDER — FLUTICASONE PROPIONATE AND SALMETEROL 250; 50 UG/1; UG/1
POWDER RESPIRATORY (INHALATION)
Qty: 60 EACH | Refills: 11 | Status: SHIPPED | OUTPATIENT
Start: 2024-08-19 | End: 2024-08-20 | Stop reason: SDUPTHER

## 2024-08-20 ENCOUNTER — PATIENT MESSAGE (OUTPATIENT)
Dept: PULMONOLOGY | Facility: CLINIC | Age: 44
End: 2024-08-20
Payer: OTHER GOVERNMENT

## 2024-08-20 DIAGNOSIS — J45.991 COUGH VARIANT ASTHMA: ICD-10-CM

## 2024-08-21 RX ORDER — FLUTICASONE PROPIONATE AND SALMETEROL 250; 50 UG/1; UG/1
1 POWDER RESPIRATORY (INHALATION) DAILY
Qty: 60 EACH | Refills: 11 | Status: SHIPPED | OUTPATIENT
Start: 2024-08-21

## 2024-08-28 ENCOUNTER — OFFICE VISIT (OUTPATIENT)
Dept: URGENT CARE | Facility: CLINIC | Age: 44
End: 2024-08-28
Payer: OTHER GOVERNMENT

## 2024-08-28 VITALS
SYSTOLIC BLOOD PRESSURE: 129 MMHG | DIASTOLIC BLOOD PRESSURE: 84 MMHG | WEIGHT: 253 LBS | HEART RATE: 91 BPM | HEIGHT: 73 IN | OXYGEN SATURATION: 97 % | TEMPERATURE: 99 F | RESPIRATION RATE: 16 BRPM | BODY MASS INDEX: 33.53 KG/M2

## 2024-08-28 DIAGNOSIS — R53.83 FATIGUE, UNSPECIFIED TYPE: Primary | ICD-10-CM

## 2024-08-28 DIAGNOSIS — U07.1 COVID-19: ICD-10-CM

## 2024-08-28 DIAGNOSIS — U07.1 COVID-19 VIRUS DETECTED: ICD-10-CM

## 2024-08-28 DIAGNOSIS — Z20.822 EXPOSURE TO COVID-19 VIRUS: ICD-10-CM

## 2024-08-28 LAB
CTP QC/QA: YES
CTP QC/QA: YES
FLUAV AG NPH QL: NEGATIVE
FLUBV AG NPH QL: NEGATIVE
SARS-COV-2 AG RESP QL IA.RAPID: POSITIVE

## 2024-08-28 PROCEDURE — 87804 INFLUENZA ASSAY W/OPTIC: CPT | Mod: QW,,, | Performed by: STUDENT IN AN ORGANIZED HEALTH CARE EDUCATION/TRAINING PROGRAM

## 2024-08-28 PROCEDURE — 99214 OFFICE O/P EST MOD 30 MIN: CPT | Mod: S$GLB,,, | Performed by: STUDENT IN AN ORGANIZED HEALTH CARE EDUCATION/TRAINING PROGRAM

## 2024-08-28 PROCEDURE — 87811 SARS-COV-2 COVID19 W/OPTIC: CPT | Mod: QW,S$GLB,, | Performed by: STUDENT IN AN ORGANIZED HEALTH CARE EDUCATION/TRAINING PROGRAM

## 2024-08-28 RX ORDER — PROMETHAZINE HYDROCHLORIDE AND DEXTROMETHORPHAN HYDROBROMIDE 6.25; 15 MG/5ML; MG/5ML
5 SYRUP ORAL EVERY 4 HOURS PRN
Qty: 118 ML | Refills: 0 | Status: SHIPPED | OUTPATIENT
Start: 2024-08-28 | End: 2024-09-07

## 2024-08-28 NOTE — PROGRESS NOTES
"Subjective:      Patient ID: Jinny Rosado is a 44 y.o. male.    Vitals:  height is 6' 1" (1.854 m) and weight is 114.8 kg (253 lb). His oral temperature is 98.8 °F (37.1 °C). His blood pressure is 129/84 and his pulse is 91. His respiration is 16 and oxygen saturation is 97%.     Chief Complaint: Fatigue    Patient is a 44-year-old male with a past medical history of asthma, hypothyroidism, and DDD who presents to clinic for evaluation of possible COVID like symptoms.  Patient reports symptoms began yesterday.  Patient reports he is vaccinated.  Patient reports positive sick exposure as his wife and daughter have had similar symptoms.  Patient states his daughter tested positive for COVID yesterday.  Patient states he has taken over-the-counter medications with only mild relief to symptoms.  Patient states that he has experienced fatigue, chills, nasal sinus congestion with postnasal drainage, sore throat, nonproductive cough, generalized body aches, and generalized headaches.  Patient denies any acute dizziness, rash, ear pain, chest pain or palpitations, shortness of breath or wheezing, abdominal pain, nausea or vomiting, diarrhea, or change in mentation.    Fatigue  This is a new problem. The current episode started yesterday. The problem occurs constantly. The problem has been gradually worsening. Associated symptoms include chills, congestion, coughing, fatigue, headaches, myalgias and a sore throat. Pertinent negatives include no abdominal pain, chest pain, nausea, rash or vomiting. Nothing aggravates the symptoms. Treatments tried: Theraflu and Mucinex D. The treatment provided no relief.       Constitution: Positive for chills and fatigue.   HENT:  Positive for congestion, postnasal drip and sore throat. Negative for ear pain.    Neck: neck negative.   Cardiovascular: Negative.  Negative for chest pain and palpitations.   Eyes: Negative.    Respiratory:  Positive for cough. Negative for chest " tightness, sputum production, shortness of breath and wheezing.    Gastrointestinal: Negative.  Negative for abdominal pain, nausea, vomiting and diarrhea.   Endocrine: negative.   Genitourinary: Negative.    Musculoskeletal:  Positive for muscle ache.   Skin: Negative.  Negative for color change, pale, rash and erythema.   Allergic/Immunologic: Negative.    Neurological:  Positive for headaches. Negative for dizziness, light-headedness, passing out, disorientation and altered mental status.   Hematologic/Lymphatic: Negative.    Psychiatric/Behavioral: Negative.  Negative for altered mental status, disorientation and confusion.       Objective:     Physical Exam   Constitutional: He is oriented to person, place, and time. He appears well-developed. He is cooperative.  Non-toxic appearance. He does not appear ill. No distress.   HENT:   Head: Normocephalic and atraumatic.   Ears:   Right Ear: Hearing, tympanic membrane, external ear and ear canal normal.   Left Ear: Hearing, tympanic membrane, external ear and ear canal normal.   Nose: Nose normal. No mucosal edema, rhinorrhea, nasal deformity or congestion. No epistaxis. Right sinus exhibits no maxillary sinus tenderness and no frontal sinus tenderness. Left sinus exhibits no maxillary sinus tenderness and no frontal sinus tenderness.   Mouth/Throat: Uvula is midline and mucous membranes are normal. Mucous membranes are moist. No trismus in the jaw. Normal dentition. No uvula swelling. Posterior oropharyngeal erythema present. No oropharyngeal exudate. Oropharynx is clear.   Eyes: Conjunctivae and lids are normal. Pupils are equal, round, and reactive to light. Right eye exhibits no discharge. Left eye exhibits no discharge. No scleral icterus.   Neck: Trachea normal and phonation normal. Neck supple. No neck rigidity present.   Cardiovascular: Normal rate, regular rhythm, normal heart sounds and normal pulses.   Pulmonary/Chest: Effort normal and breath sounds  normal. No respiratory distress (Unlabored.  Equal rise and fall of chest.  Able speak in full complete sentences.  No adventitious breath sounds noted.). He has no wheezes. He has no rhonchi. He has no rales.   Abdominal: Normal appearance and bowel sounds are normal. He exhibits no distension. Soft. There is no abdominal tenderness.   Musculoskeletal: Normal range of motion.         General: Normal range of motion.      Cervical back: He exhibits no tenderness.   Lymphadenopathy:     He has no cervical adenopathy.   Neurological: He is alert and oriented to person, place, and time. He exhibits normal muscle tone.   Skin: Skin is warm, dry, intact, not diaphoretic, not pale and no rash. Capillary refill takes less than 2 seconds. No erythema   Psychiatric: His speech is normal and behavior is normal. Judgment and thought content normal.   Nursing note and vitals reviewed.      Assessment:     1. Fatigue, unspecified type    2. Exposure to COVID-19 virus    3. COVID-19        Plan:       Fatigue, unspecified type  -     SARS Coronavirus 2 Antigen, POCT Manual Read  -     POCT Influenza A/B Rapid Antigen    Exposure to COVID-19 virus    COVID-19    Other orders  -     molnupiravir 200 mg capsule (EUA); Take 4 capsules (800 mg total) by mouth every 12 (twelve) hours. for 5 days  Dispense: 40 capsule; Refill: 0  -     promethazine-dextromethorphan (PROMETHAZINE-DM) 6.25-15 mg/5 mL Syrp; Take 5 mLs by mouth every 4 (four) hours as needed.  Dispense: 118 mL; Refill: 0                Labs:  COVID positive.  Influenza a and B negative.  Quarantine as directed.  Quarantine information provided to patient.  COVID recommendations provided.  (Vitamin-C 2000 mg daily, vitamin D3 1000 IU daily, Pepcid 40 mg daily, Zyrtec 10 mg daily, zinc 50 mg daily)  Tylenol per package instructions for any pain or fever.  May rotate with Motrin if unable to control pain or fever along with Tylenol.  Monitor home SpO2 and present to emergency  department with readings less than 92%.  Take medications as prescribed.  Assure adequate hydration.  Follow-up with PCP in 1-2 days.  Return to clinic as needed.  To ED for any new or acutely worsening symptoms including but not limited to chest pain, palpitations, shortness of breath, or fever greater than 103° F.  Patient in agreement with plan of care.    DISCLAIMER: Please note that my documentation in this Electronic Healthcare Record was produced using speech recognition software and therefore may contain errors related to that software system.These could include grammar, punctuation and spelling errors or the inclusion/exclusion of phrases that were not intended. Garbled syntax, mangled pronouns, and other bizarre constructions may be attributed to that software system.

## 2024-09-05 ENCOUNTER — OFFICE VISIT (OUTPATIENT)
Dept: URGENT CARE | Facility: CLINIC | Age: 44
End: 2024-09-05
Payer: OTHER GOVERNMENT

## 2024-09-05 VITALS
WEIGHT: 254 LBS | RESPIRATION RATE: 20 BRPM | HEIGHT: 73 IN | HEART RATE: 90 BPM | TEMPERATURE: 98 F | DIASTOLIC BLOOD PRESSURE: 99 MMHG | BODY MASS INDEX: 33.66 KG/M2 | OXYGEN SATURATION: 98 % | SYSTOLIC BLOOD PRESSURE: 133 MMHG

## 2024-09-05 DIAGNOSIS — R05.9 COUGH, UNSPECIFIED TYPE: ICD-10-CM

## 2024-09-05 DIAGNOSIS — Z87.09 HISTORY OF ASTHMA: ICD-10-CM

## 2024-09-05 DIAGNOSIS — J40 BRONCHITIS: ICD-10-CM

## 2024-09-05 DIAGNOSIS — Z86.16 HISTORY OF COVID-19: Primary | ICD-10-CM

## 2024-09-05 PROCEDURE — 71046 X-RAY EXAM CHEST 2 VIEWS: CPT | Mod: S$GLB,,, | Performed by: RADIOLOGY

## 2024-09-05 RX ORDER — PREDNISONE 20 MG/1
40 TABLET ORAL DAILY
Qty: 10 TABLET | Refills: 0 | Status: SHIPPED | OUTPATIENT
Start: 2024-09-05 | End: 2024-09-10

## 2024-09-05 RX ORDER — DOXYCYCLINE HYCLATE 100 MG
100 TABLET ORAL 2 TIMES DAILY
Qty: 20 TABLET | Refills: 0 | Status: SHIPPED | OUTPATIENT
Start: 2024-09-05 | End: 2024-09-15

## 2024-09-05 RX ORDER — BROMPHENIRAMINE MALEATE, PSEUDOEPHEDRINE HYDROCHLORIDE, AND DEXTROMETHORPHAN HYDROBROMIDE 2; 30; 10 MG/5ML; MG/5ML; MG/5ML
5 SYRUP ORAL
Qty: 118 ML | Refills: 0 | Status: SHIPPED | OUTPATIENT
Start: 2024-09-05 | End: 2024-09-15

## 2024-09-05 NOTE — PROGRESS NOTES
"Subjective:      Patient ID: Jinny Rosado is a 44 y.o. male.    Vitals:  height is 6' 1" (1.854 m) and weight is 115.2 kg (254 lb). His temperature is 98 °F (36.7 °C). His blood pressure is 133/99 (abnormal) and his pulse is 90. His respiration is 20 and oxygen saturation is 98%.     Chief Complaint: Chest Congestion    Patient is a 44-year-old male with a past medical history of asthma, hypothyroidism, and DDD who presents to clinic for evaluation of cough and congestion.  Patient reports symptoms x8 days now.  Patient previously diagnosed with COVID on August 28, 2024 and prescribed Phenergan DM and Paxlovid.  Patient states also been taking over-the-counter TheraFlu and Mucinex DM with only mild relief to symptoms.  Patient reports feels like symptoms moved from his sinuses to his chest.  Patient states that as long as he takes Mucinex his sinuses are fine.  Patient states when stopping Mucinex sinuses go back to becoming impacted.  Patient states that he does have an Advair disc and rescue inhaler however has not had to use it.  Patient states history of pneumonia twice this year and concern for that.  Patient reports that he has experienced fatigue, fever with a temperature max of 99° F however states he typically runs 97° F, sinus congestion with postnasal drainage, occasionally productive cough and some wheezing.  Patient states he has not experienced any further headaches or body aches, dizziness, rash, ear pain or sore throat, chest pain or palpitations, shortness for breath, abdominal pain, nausea or vomiting, diarrhea, or change in mentation.        Constitution: Positive for fatigue and fever (Reports temperature max 99° F however states runs 97 normally.).   HENT:  Positive for congestion and postnasal drip. Negative for ear pain and sore throat.    Neck: neck negative.   Cardiovascular: Negative.  Negative for chest pain and palpitations.   Eyes: Negative.    Respiratory:  Positive for cough, " sputum production and wheezing (Patient reports some). Negative for chest tightness and shortness of breath.    Gastrointestinal: Negative.  Negative for abdominal pain, nausea, vomiting and diarrhea.   Endocrine: negative.   Genitourinary: Negative.    Musculoskeletal: Negative.  Negative for muscle ache.   Skin: Negative.  Negative for color change, pale, rash and erythema.   Allergic/Immunologic: Negative.    Neurological: Negative.  Negative for dizziness, light-headedness, passing out, headaches, disorientation and altered mental status.   Hematologic/Lymphatic: Negative.    Psychiatric/Behavioral: Negative.  Negative for altered mental status, disorientation and confusion.       Objective:     Physical Exam   Constitutional: He is oriented to person, place, and time. He appears well-developed. He is cooperative.  Non-toxic appearance. He does not appear ill. No distress.   HENT:   Head: Normocephalic and atraumatic.   Ears:   Right Ear: Hearing, tympanic membrane, external ear and ear canal normal.   Left Ear: Hearing, tympanic membrane, external ear and ear canal normal.   Nose: Congestion present. No mucosal edema, rhinorrhea or nasal deformity. No epistaxis. Right sinus exhibits no maxillary sinus tenderness and no frontal sinus tenderness. Left sinus exhibits no maxillary sinus tenderness and no frontal sinus tenderness.   Mouth/Throat: Uvula is midline and mucous membranes are normal. Mucous membranes are moist. No trismus in the jaw. Normal dentition. No uvula swelling. Posterior oropharyngeal erythema (Mildly erythemic with postnasal drainage to oropharynx.  No cobblestoning, exudate or edema noted.) present. No oropharyngeal exudate. Oropharynx is clear.   Eyes: Conjunctivae and lids are normal. Pupils are equal, round, and reactive to light. Right eye exhibits no discharge. Left eye exhibits no discharge. No scleral icterus.   Neck: Trachea normal and phonation normal. Neck supple. No neck rigidity  present.   Cardiovascular: Normal rate, regular rhythm, normal heart sounds and normal pulses.   Pulmonary/Chest: Effort normal. No respiratory distress (Equal rise and fall of chest.  Able speak in full complete sentences.). He has decreased breath sounds in the left lower field. He has no wheezes. He has no rhonchi. He has no rales.   Abdominal: Normal appearance and bowel sounds are normal. He exhibits no distension. Soft. There is no abdominal tenderness.   Musculoskeletal: Normal range of motion.         General: Normal range of motion.      Cervical back: He exhibits no tenderness.   Lymphadenopathy:     He has no cervical adenopathy.   Neurological: He is alert and oriented to person, place, and time. He exhibits normal muscle tone.   Skin: Skin is warm, dry, intact, not diaphoretic, not pale and no rash. Capillary refill takes less than 2 seconds. No erythema   Psychiatric: His speech is normal and behavior is normal. Judgment and thought content normal.   Nursing note and vitals reviewed.      Assessment:     1. History of COVID-19    2. History of asthma    3. Cough, unspecified type    4. Bronchitis        Plan:       History of COVID-19  -     XR CHEST PA AND LATERAL; Future; Expected date: 09/05/2024    History of asthma  -     XR CHEST PA AND LATERAL; Future; Expected date: 09/05/2024    Cough, unspecified type  -     XR CHEST PA AND LATERAL; Future; Expected date: 09/05/2024    Bronchitis    Other orders  -     doxycycline (VIBRA-TABS) 100 MG tablet; Take 1 tablet (100 mg total) by mouth 2 (two) times daily. for 10 days  Dispense: 20 tablet; Refill: 0  -     predniSONE (DELTASONE) 20 MG tablet; Take 2 tablets (40 mg total) by mouth once daily. for 5 days  Dispense: 10 tablet; Refill: 0  -     brompheniramine-pseudoeph-DM (BROMFED DM) 2-30-10 mg/5 mL Syrp; Take 5 mLs by mouth every 4 to 6 hours as needed (Cough/congestion).  Dispense: 118 mL; Refill: 0                Previous visit reviewed.    Chest  x-ray: The lungs are clear, with normal appearance of pulmonary vasculature and no pleural effusion or pneumothorax. The cardiac silhouette is normal in size. The hilar and mediastinal contours are unremarkable. Bones are intact.  Take medications as prescribed.     checked through epic.   Continue previously prescribed inhalers as directed as needed.    Tylenol/Motrin per package instructions for any pain or fever.    Assure adequate hydration.    Follow-up with PCP in 1-2 days.    Return to clinic as needed.    To ED for any new or acutely worsening symptoms.    Patient in agreement with plan of care.    DISCLAIMER: Please note that my documentation in this Electronic Healthcare Record was produced using speech recognition software and therefore may contain errors related to that software system.These could include grammar, punctuation and spelling errors or the inclusion/exclusion of phrases that were not intended. Garbled syntax, mangled pronouns, and other bizarre constructions may be attributed to that software system.

## 2024-11-27 ENCOUNTER — OFFICE VISIT (OUTPATIENT)
Dept: URGENT CARE | Facility: CLINIC | Age: 44
End: 2024-11-27
Payer: OTHER GOVERNMENT

## 2024-11-27 VITALS
WEIGHT: 254 LBS | HEART RATE: 133 BPM | DIASTOLIC BLOOD PRESSURE: 84 MMHG | SYSTOLIC BLOOD PRESSURE: 130 MMHG | TEMPERATURE: 99 F | OXYGEN SATURATION: 95 % | HEIGHT: 73 IN | BODY MASS INDEX: 33.66 KG/M2 | RESPIRATION RATE: 16 BRPM

## 2024-11-27 DIAGNOSIS — R07.89 CHEST TIGHTNESS: ICD-10-CM

## 2024-11-27 DIAGNOSIS — Z87.09 HISTORY OF ASTHMA: ICD-10-CM

## 2024-11-27 DIAGNOSIS — R09.81 SINUS CONGESTION: ICD-10-CM

## 2024-11-27 DIAGNOSIS — J22 LOWER RESPIRATORY INFECTION: Primary | ICD-10-CM

## 2024-11-27 DIAGNOSIS — R05.9 COUGH, UNSPECIFIED TYPE: ICD-10-CM

## 2024-11-27 PROCEDURE — 99214 OFFICE O/P EST MOD 30 MIN: CPT | Mod: S$GLB,,,

## 2024-11-27 PROCEDURE — 87811 SARS-COV-2 COVID19 W/OPTIC: CPT | Mod: QW,S$GLB,,

## 2024-11-27 PROCEDURE — 87804 INFLUENZA ASSAY W/OPTIC: CPT | Mod: QW,,,

## 2024-11-27 RX ORDER — FLUTICASONE PROPIONATE 50 MCG
1 SPRAY, SUSPENSION (ML) NASAL DAILY
Qty: 15.8 ML | Refills: 0 | Status: SHIPPED | OUTPATIENT
Start: 2024-11-27

## 2024-11-27 RX ORDER — AMOXICILLIN AND CLAVULANATE POTASSIUM 875; 125 MG/1; MG/1
1 TABLET, FILM COATED ORAL EVERY 12 HOURS
Qty: 20 TABLET | Refills: 0 | Status: SHIPPED | OUTPATIENT
Start: 2024-11-27 | End: 2024-12-07

## 2024-11-27 RX ORDER — CETIRIZINE HYDROCHLORIDE 10 MG/1
10 TABLET ORAL DAILY
Qty: 30 TABLET | Refills: 0 | Status: SHIPPED | OUTPATIENT
Start: 2024-11-27

## 2024-11-27 RX ORDER — METHYLPREDNISOLONE 4 MG/1
TABLET ORAL
Qty: 21 EACH | Refills: 0 | Status: SHIPPED | OUTPATIENT
Start: 2024-11-27 | End: 2024-12-18

## 2024-11-27 RX ORDER — GUAIFENESIN AND DEXTROMETHORPHAN HYDROBROMIDE 10; 100 MG/5ML; MG/5ML
5 SYRUP ORAL EVERY 6 HOURS PRN
Qty: 200 ML | Refills: 0 | Status: SHIPPED | OUTPATIENT
Start: 2024-11-27 | End: 2024-12-07

## 2024-11-27 NOTE — PROGRESS NOTES
"Subjective:      Patient ID: Jinny Rosado is a 44 y.o. male.    Vitals:  height is 6' 1" (1.854 m) and weight is 115.2 kg (254 lb). His oral temperature is 99.4 °F (37.4 °C). His blood pressure is 130/84 and his pulse is 133 (abnormal). His respiration is 16 and oxygen saturation is 95%.     Chief Complaint: Cough    44-year-old male presents for evaluation of cough, chest congestion, chest tightness, asthma exacerbation x3 days.  Denies fever but has chills and low-grade temperature at triage.  Patient has a history of multiple episodes of pneumonia this year.  Chest x-ray completed.  Lung sounds are clear bilaterally with no adventitious breath sounds.    Cough  This is a new problem. Episode onset: x three days. The problem has been gradually worsening. Associated symptoms include chills, a fever (low grade), postnasal drip and wheezing. Pertinent negatives include no shortness of breath. Associated symptoms comments: Congestion.. Treatments tried: dayquil and nyquil. The treatment provided no relief.       Constitution: Positive for chills, fatigue and fever (low grade).   HENT:  Positive for congestion and postnasal drip.    Respiratory:  Positive for chest tightness, cough, wheezing and asthma. Negative for sputum production and shortness of breath.    Musculoskeletal: Negative.    Allergic/Immunologic: Positive for asthma.   Neurological: Negative.       Objective:     Physical Exam   Constitutional: He is oriented to person, place, and time. He appears well-developed. He is cooperative.  Non-toxic appearance. He does not appear ill. No distress.   HENT:   Head: Normocephalic and atraumatic.   Ears:   Right Ear: Hearing, tympanic membrane, external ear and ear canal normal.   Left Ear: Hearing, tympanic membrane, external ear and ear canal normal.   Nose: Rhinorrhea and congestion present. No mucosal edema or nasal deformity. No epistaxis. Right sinus exhibits no maxillary sinus tenderness and no " frontal sinus tenderness. Left sinus exhibits no maxillary sinus tenderness and no frontal sinus tenderness.   Mouth/Throat: Uvula is midline, oropharynx is clear and moist and mucous membranes are normal. Mucous membranes are moist. No trismus in the jaw. Normal dentition. No uvula swelling. No oropharyngeal exudate, posterior oropharyngeal edema or posterior oropharyngeal erythema.   Eyes: Conjunctivae and lids are normal. No scleral icterus.   Neck: Trachea normal and phonation normal. Neck supple. No edema present. No erythema present. No neck rigidity present.   Cardiovascular: Normal rate, regular rhythm and normal heart sounds.   Pulmonary/Chest: Effort normal and breath sounds normal. No respiratory distress. He has no decreased breath sounds. He has no wheezes. He has no rhonchi. He has no rales.   Abdominal: Normal appearance.   Musculoskeletal: Normal range of motion.         General: No deformity. Normal range of motion.   Neurological: He is alert and oriented to person, place, and time. He displays no weakness. He exhibits normal muscle tone.   Skin: Skin is warm, dry, intact, not diaphoretic and not pale.   Psychiatric: His speech is normal and behavior is normal. Mood, judgment and thought content normal.   Nursing note and vitals reviewed.      Assessment:     1. Lower respiratory infection    2. Cough, unspecified type    3. History of asthma    4. Chest tightness    5. Sinus congestion        Plan:       Lower respiratory infection  -     amoxicillin-clavulanate 875-125mg (AUGMENTIN) 875-125 mg per tablet; Take 1 tablet by mouth every 12 (twelve) hours. for 10 days  Dispense: 20 tablet; Refill: 0  -     methylPREDNISolone (MEDROL DOSEPACK) 4 mg tablet; use as directed  Dispense: 21 each; Refill: 0    Cough, unspecified type  -     SARS Coronavirus 2 Antigen, POCT Manual Read  -     POCT Influenza A/B Rapid Antigen  -     XR CHEST PA AND LATERAL; Future; Expected date: 11/27/2024  -      dextromethorphan-guaiFENesin  mg/5 ml (ROBITUSSIN-DM)  mg/5 mL liquid; Take 5 mLs by mouth every 6 (six) hours as needed (Cough).  Dispense: 200 mL; Refill: 0    History of asthma    Chest tightness    Sinus congestion  -     fluticasone propionate (FLONASE) 50 mcg/actuation nasal spray; 1 spray (50 mcg total) by Each Nostril route once daily.  Dispense: 15.8 mL; Refill: 0  -     cetirizine (ZYRTEC) 10 MG tablet; Take 1 tablet (10 mg total) by mouth once daily.  Dispense: 30 tablet; Refill: 0      COVID: neg  FLU: neg    Chest x-ray reviewed independently and I have concerns for left lower lobe infiltrates.    Discussed medication with patient who acknowledges understanding and is agreeable to POC. Follow up with primary care. Increase fluid intake. Red flags for ER discussed.

## 2024-12-15 ENCOUNTER — HOSPITAL ENCOUNTER (EMERGENCY)
Facility: HOSPITAL | Age: 44
Discharge: HOME | End: 2024-12-15
Attending: STUDENT IN AN ORGANIZED HEALTH CARE EDUCATION/TRAINING PROGRAM
Payer: OTHER GOVERNMENT

## 2024-12-15 VITALS
SYSTOLIC BLOOD PRESSURE: 124 MMHG | BODY MASS INDEX: 33.66 KG/M2 | HEIGHT: 73 IN | DIASTOLIC BLOOD PRESSURE: 91 MMHG | RESPIRATION RATE: 18 BRPM | OXYGEN SATURATION: 97 % | WEIGHT: 254 LBS | TEMPERATURE: 98 F | HEART RATE: 91 BPM

## 2024-12-15 DIAGNOSIS — K80.50 BILIARY COLIC: ICD-10-CM

## 2024-12-15 DIAGNOSIS — R11.0 NAUSEA: ICD-10-CM

## 2024-12-15 DIAGNOSIS — K80.20 CHOLELITHIASIS: Primary | ICD-10-CM

## 2024-12-15 DIAGNOSIS — R10.11 ABDOMINAL PAIN, RIGHT UPPER QUADRANT: ICD-10-CM

## 2024-12-15 LAB
ALBUMIN SERPL BCP-MCNC: 4 G/DL (ref 3.5–5.2)
ALP SERPL-CCNC: 72 U/L (ref 40–150)
ALT SERPL W/O P-5'-P-CCNC: 76 U/L (ref 10–44)
ANION GAP SERPL CALC-SCNC: 10 MMOL/L (ref 8–16)
AST SERPL-CCNC: 38 U/L (ref 10–40)
BASOPHILS # BLD AUTO: 0.03 K/UL (ref 0–0.2)
BASOPHILS NFR BLD: 0.5 % (ref 0–1.9)
BILIRUB SERPL-MCNC: 0.2 MG/DL (ref 0.1–1)
BILIRUB UR QL STRIP: NEGATIVE
BUN SERPL-MCNC: 13 MG/DL (ref 6–20)
CALCIUM SERPL-MCNC: 9.3 MG/DL (ref 8.7–10.5)
CHLORIDE SERPL-SCNC: 109 MMOL/L (ref 95–110)
CLARITY UR: CLEAR
CO2 SERPL-SCNC: 22 MMOL/L (ref 23–29)
COLOR UR: YELLOW
CREAT SERPL-MCNC: 1 MG/DL (ref 0.5–1.4)
DIFFERENTIAL METHOD BLD: ABNORMAL
EOSINOPHIL # BLD AUTO: 0.1 K/UL (ref 0–0.5)
EOSINOPHIL NFR BLD: 2 % (ref 0–8)
ERYTHROCYTE [DISTWIDTH] IN BLOOD BY AUTOMATED COUNT: 12.5 % (ref 11.5–14.5)
EST. GFR  (NO RACE VARIABLE): >60 ML/MIN/1.73 M^2
GLUCOSE SERPL-MCNC: 100 MG/DL (ref 70–110)
GLUCOSE UR QL STRIP: NEGATIVE
HCT VFR BLD AUTO: 47.4 % (ref 40–54)
HGB BLD-MCNC: 16.1 G/DL (ref 14–18)
HGB UR QL STRIP: NEGATIVE
IMM GRANULOCYTES # BLD AUTO: 0.01 K/UL (ref 0–0.04)
IMM GRANULOCYTES NFR BLD AUTO: 0.2 % (ref 0–0.5)
KETONES UR QL STRIP: NEGATIVE
LEUKOCYTE ESTERASE UR QL STRIP: NEGATIVE
LIPASE SERPL-CCNC: 59 U/L (ref 4–60)
LYMPHOCYTES # BLD AUTO: 1.3 K/UL (ref 1–4.8)
LYMPHOCYTES NFR BLD: 21.2 % (ref 18–48)
MCH RBC QN AUTO: 30.6 PG (ref 27–31)
MCHC RBC AUTO-ENTMCNC: 34 G/DL (ref 32–36)
MCV RBC AUTO: 90 FL (ref 82–98)
MONOCYTES # BLD AUTO: 0.5 K/UL (ref 0.3–1)
MONOCYTES NFR BLD: 9 % (ref 4–15)
NEUTROPHILS # BLD AUTO: 4 K/UL (ref 1.8–7.7)
NEUTROPHILS NFR BLD: 67.1 % (ref 38–73)
NITRITE UR QL STRIP: NEGATIVE
NRBC BLD-RTO: 0 /100 WBC
PH UR STRIP: 6 [PH] (ref 5–8)
PLATELET # BLD AUTO: 250 K/UL (ref 150–450)
PMV BLD AUTO: 8.6 FL (ref 9.2–12.9)
POTASSIUM SERPL-SCNC: 3.8 MMOL/L (ref 3.5–5.1)
PROT SERPL-MCNC: 7.2 G/DL (ref 6–8.4)
PROT UR QL STRIP: ABNORMAL
RBC # BLD AUTO: 5.26 M/UL (ref 4.6–6.2)
SODIUM SERPL-SCNC: 141 MMOL/L (ref 136–145)
SP GR UR STRIP: >1.03 (ref 1–1.03)
URN SPEC COLLECT METH UR: ABNORMAL
UROBILINOGEN UR STRIP-ACNC: NEGATIVE EU/DL
WBC # BLD AUTO: 5.99 K/UL (ref 3.9–12.7)

## 2024-12-15 PROCEDURE — 63600175 PHARM REV CODE 636 W HCPCS

## 2024-12-15 PROCEDURE — 80053 COMPREHEN METABOLIC PANEL: CPT

## 2024-12-15 PROCEDURE — 36415 COLL VENOUS BLD VENIPUNCTURE: CPT

## 2024-12-15 PROCEDURE — 96374 THER/PROPH/DIAG INJ IV PUSH: CPT

## 2024-12-15 PROCEDURE — 81003 URINALYSIS AUTO W/O SCOPE: CPT

## 2024-12-15 PROCEDURE — 96375 TX/PRO/DX INJ NEW DRUG ADDON: CPT

## 2024-12-15 PROCEDURE — 85025 COMPLETE CBC W/AUTO DIFF WBC: CPT

## 2024-12-15 PROCEDURE — 99285 EMERGENCY DEPT VISIT HI MDM: CPT | Mod: 25

## 2024-12-15 PROCEDURE — 83690 ASSAY OF LIPASE: CPT

## 2024-12-15 PROCEDURE — 25500020 PHARM REV CODE 255

## 2024-12-15 RX ORDER — TRAMADOL HYDROCHLORIDE 50 MG/1
50 TABLET ORAL EVERY 6 HOURS PRN
Qty: 12 TABLET | Refills: 0 | Status: SHIPPED | OUTPATIENT
Start: 2024-12-15

## 2024-12-15 RX ORDER — ONDANSETRON HYDROCHLORIDE 2 MG/ML
4 INJECTION, SOLUTION INTRAVENOUS
Status: COMPLETED | OUTPATIENT
Start: 2024-12-15 | End: 2024-12-15

## 2024-12-15 RX ORDER — KETOROLAC TROMETHAMINE 30 MG/ML
15 INJECTION, SOLUTION INTRAMUSCULAR; INTRAVENOUS
Status: COMPLETED | OUTPATIENT
Start: 2024-12-15 | End: 2024-12-15

## 2024-12-15 RX ORDER — ONDANSETRON 4 MG/1
4 TABLET, ORALLY DISINTEGRATING ORAL EVERY 6 HOURS PRN
Qty: 15 TABLET | Refills: 0 | Status: SHIPPED | OUTPATIENT
Start: 2024-12-15

## 2024-12-15 RX ORDER — NAPROXEN 500 MG/1
500 TABLET ORAL 2 TIMES DAILY PRN
Qty: 30 TABLET | Refills: 0 | Status: SHIPPED | OUTPATIENT
Start: 2024-12-15

## 2024-12-15 RX ORDER — MORPHINE SULFATE 4 MG/ML
4 INJECTION, SOLUTION INTRAMUSCULAR; INTRAVENOUS
Status: COMPLETED | OUTPATIENT
Start: 2024-12-15 | End: 2024-12-15

## 2024-12-15 RX ADMIN — MORPHINE SULFATE 4 MG: 4 INJECTION INTRAVENOUS at 11:12

## 2024-12-15 RX ADMIN — IOHEXOL 100 ML: 350 INJECTION, SOLUTION INTRAVENOUS at 10:12

## 2024-12-15 RX ADMIN — KETOROLAC TROMETHAMINE 15 MG: 30 INJECTION, SOLUTION INTRAMUSCULAR at 10:12

## 2024-12-15 RX ADMIN — ONDANSETRON 4 MG: 2 INJECTION INTRAMUSCULAR; INTRAVENOUS at 10:12

## 2024-12-15 NOTE — Clinical Note
"Jinny"Narciso Rosado was seen and treated in our emergency department on 12/15/2024.  He may return to work on 12/18/2024.       If you have any questions or concerns, please don't hesitate to call.      Holdsworth, Alayna, PA-C"

## 2024-12-15 NOTE — ED PROVIDER NOTES
Encounter Date: 12/15/2024       History     Chief Complaint   Patient presents with    Abdominal Pain     Rt. Sided pain , started this am     Nausea     44-year-old male with a past medical history of asthma and hypothyroidism presents to ED for abdominal pain.  Patient states this started this morning.  Patient complaining of a right upper and right lower quadrant aching stabbing constant 7/10 pain.  Tums with no relief.  Denies anything making it worse.  Denies any previous abdominal surgeries.  Denies ever feeling like this before.  Denies any history of kidney stones.  Patient also admits to sweats and nausea.  Patient denies fever, chills, shortness breath, chest pain, vomiting, diarrhea, constipation, urinary symptoms, headaches, dizziness, lightheadedness, and rashes.      Review of patient's allergies indicates:   Allergen Reactions    Grass pollen-june grass standard Other (See Comments)    Grass pollen      Other Reaction(s): Nasal congestion (finding), Pruritic rash (disorder)     Past Medical History:   Diagnosis Date    Ankylosing spondylitis of unspecified sites in spine     Carpal tunnel syndrome on both sides     Sleep apnea     Tarsal tunnel syndrome, bilateral      Past Surgical History:   Procedure Laterality Date    ENDOSCOPIC CARPAL TUNNEL RELEASE Right 11/03/2023    Procedure: RELEASE, CARPAL TUNNEL, ENDOSCOPIC - RIGHT;  Surgeon: Hilton Perez MD;  Location: [facility] OR;  Service: Orthopedics;  Laterality: Right;    EPIDURAL STEROID INJECTION INTO CERVICAL SPINE N/A 10/13/2023    Procedure: Injection-steroid-epidural-cervical;  Surgeon: Arpit Luna MD;  Location: [facility] OR;  Service: Anesthesiology;  Laterality: N/A;  c7-t1    left knee surgery      RELEASE OF ULNAR NERVE AT CUBITAL TUNNEL Right 11/03/2023    Procedure: RELEASE, ULNAR TUNNEL - RIGHT;  Surgeon: Hilton Perez MD;  Location: [facility] OR;  Service: Orthopedics;  Laterality: Right;    RHINOPLASTY      right foot surgery       TOTAL THYROIDECTOMY  2023    VASECTOMY       Family History   Problem Relation Name Age of Onset    COPD Mother      Asthma Mother      Arthritis Mother      COPD Father      Thyroid cancer Father      Lung cancer Father      Stroke Sister      Asthma Sister      Breast cancer Maternal Grandmother      Lung cancer Paternal Grandmother      Lung cancer Paternal Grandfather       Social History     Tobacco Use    Smoking status: Former     Current packs/day: 0.00     Types: Cigarettes     Quit date:      Years since quittin.9    Smokeless tobacco: Never   Substance Use Topics    Alcohol use: Yes     Comment: occ.    Drug use: Not Currently     Review of Systems   Constitutional:  Positive for diaphoresis. Negative for chills and fever.   Respiratory:  Negative for shortness of breath.    Cardiovascular:  Negative for chest pain.   Gastrointestinal:  Positive for abdominal pain and nausea. Negative for constipation, diarrhea and vomiting.   Genitourinary:  Negative for decreased urine volume, difficulty urinating, dysuria, frequency, hematuria and urgency.   Musculoskeletal:  Negative for myalgias.   Skin:  Negative for rash.   Neurological:  Negative for dizziness, weakness, light-headedness and headaches.       Physical Exam     Initial Vitals [12/15/24 0908]   BP Pulse Resp Temp SpO2   (!) 137/93 94 18 97.5 °F (36.4 °C) 96 %      MAP       --         Physical Exam    Nursing note and vitals reviewed.  Constitutional: He appears well-developed and well-nourished. He is not diaphoretic. He is active. He does not appear ill. No distress.   HENT:   Head: Normocephalic and atraumatic.   Right Ear: External ear normal.   Left Ear: External ear normal.   Nose: Nose normal.   Eyes: Conjunctivae, EOM and lids are normal. Pupils are equal, round, and reactive to light. Right eye exhibits no discharge. Left eye exhibits no discharge. No scleral icterus.   Neck: Phonation normal. Neck supple.   Normal range of  motion.   Full passive range of motion without pain.     Cardiovascular:  Normal rate and regular rhythm.           Pulmonary/Chest: Effort normal and breath sounds normal. No respiratory distress.   Abdominal: Abdomen is soft. He exhibits no distension and no mass. There is abdominal tenderness in the right upper quadrant and right lower quadrant.   No right CVA tenderness.  No left CVA tenderness. There is no rebound, no guarding, no tenderness at McBurney's point and negative Pinto's sign. positive psoas signnegative Rovsing's sign  Musculoskeletal:         General: Normal range of motion.      Cervical back: Full passive range of motion without pain, normal range of motion and neck supple.     Neurological: He is alert and oriented to person, place, and time. He has normal strength. GCS eye subscore is 4. GCS verbal subscore is 5. GCS motor subscore is 6.   Skin: Skin is dry. Capillary refill takes less than 2 seconds. No rash noted.         ED Course   Procedures  Labs Reviewed   CBC W/ AUTO DIFFERENTIAL - Abnormal       Result Value    WBC 5.99      RBC 5.26      Hemoglobin 16.1      Hematocrit 47.4      MCV 90      MCH 30.6      MCHC 34.0      RDW 12.5      Platelets 250      MPV 8.6 (*)     Immature Granulocytes 0.2      Gran # (ANC) 4.0      Immature Grans (Abs) 0.01      Lymph # 1.3      Mono # 0.5      Eos # 0.1      Baso # 0.03      nRBC 0      Gran % 67.1      Lymph % 21.2      Mono % 9.0      Eosinophil % 2.0      Basophil % 0.5      Differential Method Automated     COMPREHENSIVE METABOLIC PANEL - Abnormal    Sodium 141      Potassium 3.8      Chloride 109      CO2 22 (*)     Glucose 100      BUN 13      Creatinine 1.0      Calcium 9.3      Total Protein 7.2      Albumin 4.0      Total Bilirubin 0.2      Alkaline Phosphatase 72      AST 38      ALT 76 (*)     eGFR >60      Anion Gap 10     URINALYSIS, REFLEX TO URINE CULTURE - Abnormal    Specimen UA Urine, Clean Catch      Color, UA Yellow       Appearance, UA Clear      pH, UA 6.0      Specific Gravity, UA >1.030 (*)     Protein, UA Trace (*)     Glucose, UA Negative      Ketones, UA Negative      Bilirubin (UA) Negative      Occult Blood UA Negative      Nitrite, UA Negative      Urobilinogen, UA Negative      Leukocytes, UA Negative      Narrative:     Specimen Source->Urine   LIPASE    Lipase 59            Imaging Results              US Abdomen Limited (Gallbladder) (Final result)  Result time 12/15/24 12:31:16      Final result by Pavel Hernandez MD (12/15/24 12:31:16)                   Impression:      1. Cholelithiasis without sonographic evidence of acute cholecystitis.  2. Hepatomegaly and hepatic steatosis.  3. Small right renal cyst.      Electronically signed by: Pavel Hernandez  Date:    12/15/2024  Time:    12:31               Narrative:    EXAMINATION:  US ABDOMEN LIMITED    CLINICAL HISTORY:  Right upper quadrant pain    TECHNIQUE:  Limited ultrasound of the right upper quadrant of the abdomen (including pancreas, liver, gallbladder, common bile duct, and spleen) was performed.    COMPARISON:  CT abdomen and pelvis 12/15/2024    FINDINGS:  Liver: Mildly enlarged, measuring 18 cm. Hepatic parenchyma is diffusely increased in echogenicity and attenuating to sound, suggestive of with hepatic steatosis. No focal hepatic lesions.    Gallbladder: 1.1 cm gallstone at the gallbladder neck.  There is no gallbladder wall thickening or pericholecystic fluid.  No sonographic Pinto's sign.    Biliary system: The common duct is not dilated, measuring 5 mm.  No intrahepatic ductal dilatation.    Right kidney: Right kidney measures 12.5 cm.  No hydronephrosis.  There is a 1.5 cm simple-appearing cyst in the interpolar region.    Miscellaneous: No upper abdominal ascites.                                       CT Abdomen Pelvis With IV Contrast NO Oral Contrast (Final result)  Result time 12/15/24 10:48:06      Final result by Pavel Hernandez MD (12/15/24 10:48:06)                    Impression:      1. No evidence of an acute abdominopelvic abnormality.  2. Cholelithiasis without evidence of acute cholecystitis.  Correlation with right upper quadrant ultrasound could be performed, as warranted.  3. Nonobstructive right nephrolithiasis.  4. Diverticulosis coli.  5. Small, fat containing umbilical hernia.  6. Hepatomegaly and hepatic steatosis.      Electronically signed by: Pavel Hernandez  Date:    12/15/2024  Time:    10:48               Narrative:    EXAMINATION:  CT ABDOMEN PELVIS WITH IV CONTRAST    CLINICAL HISTORY:  Nausea/vomiting;RLQ abdominal pain (Age >= 14y);RUQ;    TECHNIQUE:  Low dose axial images, sagittal and coronal reformations were obtained from the lung bases to the pubic symphysis following the IV administration of 100 mL of Omnipaque 350 .  Oral contrast was not administered.    COMPARISON:  None.    FINDINGS:  Abdomen:    - Lower thorax:Heart is normal in size.  No pericardial effusion.    - Lung bases: Minimal bibasilar atelectasis.  Pleural effusion.    - Liver: Liver is enlarged and measures 20 cm.  Hepatic parenchyma is diffusely decreased in attenuation suggestive of hepatic steatosis.  No focal hepatic mass.    - Gallbladder: Rim calcified 1.5 cm gallstone at the gallbladder neck.  Additional punctate calcified gallstone noted.  No gallbladder wall thickening or pericholecystic inflammatory change.    - Bile Ducts: No evidence of intra or extra hepatic biliary ductal dilation.    - Pancreas: No definite mass, peripancreatic inflammatory fat stranding, or ductal dilatation.    - Stomach: No significant abnormality.    - Spleen: Normal in size and attenuation.  No focal lesion.    - Adrenals: Adrenal glands appear within normal limits.    - Kidneys: Kidneys are normal in size and enhance appropriately. No enhancing mass.  2 mm nonobstructive stone in the right lower kidney collecting system.  No ureterolithiasis hydronephrosis.    - Bladder: No abnormal  bladder wall thickening.    - Reproductive: Prostate gland is normal in size and contains dystrophic calcifications.    - Bowel/Mesentery: Diverticulosis coli.  No evidence of bowel obstruction or inflammation.  No ascites or pneumoperitoneum.  No evidence of appendicitis.    - Lymph nodes: No pathologically enlarged lymph nodes.    - Vascular:  No abdominal aortic aneurysm.    - Abdominal Wall/Soft Tissues: Fat containing umbilical hernia.    - Bones: Age-appropriate degenerative changes of the osseous structures.  Schmorl's node at the superior endplate of T11. No acute osseous abnormality and no suspicious lytic or blastic lesion.                                       Medications   ondansetron injection 4 mg (4 mg Intravenous Given 12/15/24 1014)   ketorolac injection 15 mg (15 mg Intravenous Given 12/15/24 1014)   iohexoL (OMNIPAQUE 350) 350 mg iodine/mL injection (100 mLs Intravenous Given 12/15/24 1017)   morphine injection 4 mg (4 mg Intravenous Given 12/15/24 1122)     Medical Decision Making  44-year-old male with a past medical history of asthma and hypothyroidism presents to ED for abdominal pain.  Patient's chart and medical history reviewed.    Ddx:  Cholecystitis  Choledocholithiasis  Pancreatitis  Gastritis  GERD  SBO  Colitis  Appendicitis  UTI    Patient's vitals reviewed.  Afebrile, no respiratory distress, and nontoxic-appearing in the ED. Patient had right upper and right lower quadrant tenderness to palpation with no guarding or rebound tenderness.  Positive psoas sign.  Negative CVA tenderness.  No rashes appreciated, unlikely shingles.  With shared decision-making we will get labs and imaging today.  Patient given Zofran and Toradol for symptomatic control.  UA unremarkable for infection. CBC unremarkable. CMP showed mildly elevated ALT of 76; otherwise unremarkable. Lipase was in normal range, pancreatitis unlikely. CT abd pelvis showed No evidence of an acute abdominopelvic abnormality.  Cholelithiasis without evidence of acute cholecystitis.  Correlation with right upper quadrant ultrasound could be performed, as warranted. Nonobstructive right nephrolithiasis. Diverticulosis coli. Small, fat containing umbilical hernia. Hepatomegaly and hepatic steatosis.  Patient is still having significant pain but states nausea has improved.  Patient given a dose of morphine.  With shared decision-making we will get ultrasound of the gallbladder. US showed  Cholelithiasis without sonographic evidence of acute cholecystitis. Hepatomegaly and hepatic steatosis. Small right renal cyst.  Discussed all results with patient, he verbalized understanding.  Patient states he is feeling better and would prefer an outpatient referral to General surgery rather than an inpatient stay today.  Discussed with patient to rest and stay well hydrated.  Gallbladder diet discussed, he verbalized understanding.  Referral sent to General surgery.  Patient will be sent home with short course of tramadol, naproxen, and Zofran for symptomatic control.  Patient follow-up general surgery in his PCP. Patient agrees with this plan. Discussed with him strict return precautions, he verbalized understanding. Patient is stable for discharge.     Amount and/or Complexity of Data Reviewed  External Data Reviewed: labs, radiology and notes.  Labs: ordered.  Radiology: ordered.    Risk  Prescription drug management.                                      Clinical Impression:  Final diagnoses:  [R10.11] Abdominal pain, right upper quadrant  [K80.20] Cholelithiasis (Primary)  [R11.0] Nausea  [K80.50] Biliary colic          ED Disposition Condition    Discharge Stable          ED Prescriptions       Medication Sig Dispense Start Date End Date Auth. Provider    ondansetron (ZOFRAN-ODT) 4 MG TbDL Take 1 tablet (4 mg total) by mouth every 6 (six) hours as needed (Nausea). 15 tablet 12/15/2024 -- Holdsworth, Alayna, PA-C    naproxen (NAPROSYN) 500 MG tablet  Take 1 tablet (500 mg total) by mouth 2 (two) times daily as needed (Pain). 30 tablet 12/15/2024 -- Holdsworth, Alayna, PA-C    traMADoL (ULTRAM) 50 mg tablet Take 1 tablet (50 mg total) by mouth every 6 (six) hours as needed for Pain. 12 tablet 12/15/2024 -- Holdsworth, Alayna, PA-C          Follow-up Information       Follow up With Specialties Details Why Contact Info    Annmarie [facility] [address] [place] [address]             Holdsworth, Alayna, PA-C  12/15/24 2909

## 2024-12-15 NOTE — DISCHARGE INSTRUCTIONS

## 2025-05-12 ENCOUNTER — OFFICE VISIT (OUTPATIENT)
Dept: URGENT CARE | Facility: CLINIC | Age: 45
End: 2025-05-12
Payer: OTHER GOVERNMENT

## 2025-05-12 VITALS
HEART RATE: 97 BPM | OXYGEN SATURATION: 99 % | DIASTOLIC BLOOD PRESSURE: 82 MMHG | TEMPERATURE: 98 F | SYSTOLIC BLOOD PRESSURE: 112 MMHG | RESPIRATION RATE: 18 BRPM

## 2025-05-12 DIAGNOSIS — R19.7 NAUSEA VOMITING AND DIARRHEA: Primary | ICD-10-CM

## 2025-05-12 DIAGNOSIS — K52.9 AGE (ACUTE GASTROENTERITIS): ICD-10-CM

## 2025-05-12 DIAGNOSIS — R52 GENERALIZED BODY ACHES: ICD-10-CM

## 2025-05-12 DIAGNOSIS — R11.2 NAUSEA VOMITING AND DIARRHEA: Primary | ICD-10-CM

## 2025-05-12 LAB
CTP QC/QA: YES
CTP QC/QA: YES
FLUAV AG NPH QL: NEGATIVE
FLUBV AG NPH QL: NEGATIVE
SARS-COV+SARS-COV-2 AG RESP QL IA.RAPID: NEGATIVE

## 2025-05-12 PROCEDURE — 87811 SARS-COV-2 COVID19 W/OPTIC: CPT | Mod: QW,S$GLB,, | Performed by: STUDENT IN AN ORGANIZED HEALTH CARE EDUCATION/TRAINING PROGRAM

## 2025-05-12 PROCEDURE — 99214 OFFICE O/P EST MOD 30 MIN: CPT | Mod: S$GLB,,, | Performed by: STUDENT IN AN ORGANIZED HEALTH CARE EDUCATION/TRAINING PROGRAM

## 2025-05-12 PROCEDURE — 87804 INFLUENZA ASSAY W/OPTIC: CPT | Mod: QW,,, | Performed by: STUDENT IN AN ORGANIZED HEALTH CARE EDUCATION/TRAINING PROGRAM

## 2025-05-12 RX ORDER — LOPERAMIDE HYDROCHLORIDE 2 MG/1
2 CAPSULE ORAL 4 TIMES DAILY PRN
Qty: 20 CAPSULE | Refills: 0 | Status: SHIPPED | OUTPATIENT
Start: 2025-05-12 | End: 2025-05-16

## 2025-05-12 RX ORDER — ONDANSETRON 4 MG/1
4 TABLET, ORALLY DISINTEGRATING ORAL EVERY 6 HOURS PRN
Qty: 20 TABLET | Refills: 0 | Status: SHIPPED | OUTPATIENT
Start: 2025-05-12 | End: 2025-05-16

## 2025-05-12 NOTE — LETTER
May 12, 2025      [facility]  [address]  [facility] [address]  Phone: [phone]       Patient: Jinny Rosado   YOB: 1980  Date of Visit: 05/12/2025    To Whom It May Concern:    Bishnu Rosado  was at [facility] on 05/12/2025. The patient may return to work/school on 05/13/2025 with no restrictions. If you have any questions or concerns, or if I can be of further assistance, please do not hesitate to contact me.    Sincerely,    Álvaro Ivan NP

## 2025-05-12 NOTE — PROGRESS NOTES
Subjective:      Patient ID: Jinny Rosado is a 45 y.o. male.    Vitals:  temperature is 98.2 °F (36.8 °C). His blood pressure is 112/82 and his pulse is 97. His respiration is 18 and oxygen saturation is 99%.     Chief Complaint: URI    Patient is a 45-year-old male with a past medical history of asthma and hypothyroidism who presents to clinic for evaluation of possible flu-like symptoms.  Patient reports symptoms times 2 days however worse yesterday.  Patient states that if symptoms have improved today.  Patient states that his children were sick with stomach bug last week.  Patient states no abnormal food intake or other known sick exposures.  Patient states that he has took over-the-counter Mucinex all in 1 with mild relief to symptoms.  Patient states that he is vaccinated for flu.  Patient states that he has experienced fatigue, chills, fever with a temperature max of 102° F, slight nasal congestion postnasal drainage and cough along with generalized body aches, headaches, nausea and vomiting and diarrhea.  Patient states that he has not experienced any ear pain or sore throat, chest pain or palpitations, shortness for breath, abdominal pain, rectal bleeding, urinary symptoms, rash, dizziness, or change in mentation.    URI   Associated symptoms include congestion, coughing, diarrhea, headaches, nausea and vomiting. Pertinent negatives include no abdominal pain, chest pain, ear pain, rash or sore throat.       Constitution: Positive for chills, fatigue and fever (Temperature max 102F).   HENT:  Positive for congestion and postnasal drip. Negative for ear pain and sore throat.    Neck: neck negative.   Cardiovascular: Negative.  Negative for chest pain and palpitations.   Eyes: Negative.    Respiratory:  Positive for cough. Negative for chest tightness, sputum production and shortness of breath.    Gastrointestinal:  Positive for nausea, vomiting and diarrhea. Negative for abdominal pain.   Endocrine:  negative.   Genitourinary: Negative.    Musculoskeletal:  Positive for muscle ache.   Skin: Negative.  Negative for color change, pale, rash and erythema.   Allergic/Immunologic: Negative.    Neurological:  Positive for headaches. Negative for dizziness, light-headedness, passing out, disorientation and altered mental status.   Hematologic/Lymphatic: Negative.    Psychiatric/Behavioral: Negative.  Negative for altered mental status, disorientation and confusion.       Objective:     Physical Exam   Constitutional: He is oriented to person, place, and time. He appears well-developed. He is cooperative.  Non-toxic appearance. He does not appear ill. No distress.   HENT:   Head: Normocephalic and atraumatic.   Ears:   Right Ear: Hearing, tympanic membrane, external ear and ear canal normal.   Left Ear: Hearing, tympanic membrane, external ear and ear canal normal.   Nose: Nose normal. No mucosal edema, rhinorrhea, nasal deformity or congestion. No epistaxis. Right sinus exhibits no maxillary sinus tenderness and no frontal sinus tenderness. Left sinus exhibits no maxillary sinus tenderness and no frontal sinus tenderness.   Mouth/Throat: Uvula is midline, oropharynx is clear and moist and mucous membranes are normal. Mucous membranes are moist. No trismus in the jaw. Normal dentition. No uvula swelling. No oropharyngeal exudate or posterior oropharyngeal erythema. Oropharynx is clear.   Eyes: Conjunctivae and lids are normal. Pupils are equal, round, and reactive to light. Right eye exhibits no discharge. Left eye exhibits no discharge. No scleral icterus.   Neck: Trachea normal and phonation normal. Neck supple. No neck rigidity present.   Cardiovascular: Normal rate, regular rhythm, normal heart sounds and normal pulses.   Pulmonary/Chest: Effort normal and breath sounds normal. No respiratory distress. He has no wheezes. He has no rhonchi. He has no rales.   Abdominal: Normal appearance and bowel sounds are normal.  He exhibits no distension. Soft. There is no abdominal tenderness.   Musculoskeletal: Normal range of motion.         General: Normal range of motion.      Cervical back: He exhibits no tenderness.   Lymphadenopathy:     He has no cervical adenopathy.   Neurological: He is alert and oriented to person, place, and time. He exhibits normal muscle tone.   Skin: Skin is warm, dry, intact, not diaphoretic, not pale and no rash. Capillary refill takes less than 2 seconds. No erythema   Psychiatric: His speech is normal and behavior is normal. Judgment and thought content normal.   Nursing note and vitals reviewed.      Assessment:     1. Nausea vomiting and diarrhea    2. Generalized body aches    3. AGE (acute gastroenteritis)        Plan:       Nausea vomiting and diarrhea  -     SARS Coronavirus 2 Antigen, POCT Manual Read  -     POCT Influenza A/B Rapid Antigen    Generalized body aches  -     SARS Coronavirus 2 Antigen, POCT Manual Read  -     POCT Influenza A/B Rapid Antigen    AGE (acute gastroenteritis)    Other orders  -     ondansetron (ZOFRAN-ODT) 4 MG TbDL; Take 1 tablet (4 mg total) by mouth every 6 (six) hours as needed (Nausea).  Dispense: 20 tablet; Refill: 0  -     loperamide (IMODIUM) 2 mg capsule; Take 1 capsule (2 mg total) by mouth 4 (four) times daily as needed for Diarrhea.  Dispense: 20 capsule; Refill: 0                Labs:  Influenza a and B negative.  COVID negative.  Take medications as prescribed.  Tylenol/Motrin per package instructions for any pain or fever.  Assure adequate hydration, continued urinary output and rest.  Recommend bland diet for 24-48 hours then progress as tolerated.  Recommend avoiding any spicy, saucy, greasy, fried, or fatty foods.  Follow-up with PCP in 1-2 days.  Return to clinic as needed.  To ED for any new or acutely worsening symptoms.  Patient in agreement with plan of care.  Work excuse provided.    DISCLAIMER: Please note that my documentation in this  Electronic Healthcare Record was produced using speech recognition software and therefore may contain errors related to that software system.These could include grammar, punctuation and spelling errors or the inclusion/exclusion of phrases that were not intended. Garbled syntax, mangled pronouns, and other bizarre constructions may be attributed to that software system.

## 2025-05-12 NOTE — LETTER
May 12, 2025      [facility]  [address]  [facility] [address]  Phone: [phone]       Patient: Jinny Rosado   YOB: 1980  Date of Visit: 05/12/2025    To Whom It May Concern:    Bishnu Rosado  was at [facility] on 05/12/2025. The patient may return to work/school on 05/14/2025 with no restrictions. If you have any questions or concerns, or if I can be of further assistance, please do not hesitate to contact me.    Sincerely,    Álvaro Ivan NP

## 2025-05-16 RX ORDER — ONDANSETRON 4 MG/1
TABLET, ORALLY DISINTEGRATING ORAL
Qty: 20 TABLET | Refills: 0 | Status: SHIPPED | OUTPATIENT
Start: 2025-05-16

## 2025-05-16 RX ORDER — LOPERAMIDE HYDROCHLORIDE 2 MG/1
CAPSULE ORAL
Qty: 20 CAPSULE | Refills: 0 | Status: SHIPPED | OUTPATIENT
Start: 2025-05-16

## (undated) DEVICE — SUT VICRYL 4-0 RB1 27IN UD

## (undated) DEVICE — GLOVE BIOGEL PI MICRO SZ 7.5

## (undated) DEVICE — BANDAGE MATRIX HK LOOP 2IN 5YD

## (undated) DEVICE — TOURNIQUET SB QC DP 18X4IN

## (undated) DEVICE — DRESSING XEROFORM NONADH 1X8IN

## (undated) DEVICE — PAD CAST 2 IN X 4YDS STERILE

## (undated) DEVICE — SYR DISP LL 5CC

## (undated) DEVICE — TUBING MINIBORE EXTENSION

## (undated) DEVICE — NDL TUOHY EPIDURAL 20G X 3.5

## (undated) DEVICE — COVER LIGHT HANDLE 80/CA

## (undated) DEVICE — KIT ENDO CARPEL TUNNAL SINGLE

## (undated) DEVICE — CHLORAPREP 10.5 ML APPLICATOR

## (undated) DEVICE — SUT 4-0 ETHILON 18 PS-2

## (undated) DEVICE — CORD FOR BIPOLAR FORCEPS 12

## (undated) DEVICE — NDL BLUNT W/O FILTER 18GX1.5IN

## (undated) DEVICE — SPLINT PLASTER FAST SET 5X30IN

## (undated) DEVICE — SOCKINETTE DOUBLE PLY 4X48IN

## (undated) DEVICE — DRAPE U SPLIT SHEET 54X76IN

## (undated) DEVICE — PAD CAST SPECIALIST STRL 4

## (undated) DEVICE — Device

## (undated) DEVICE — DRAPE STERI-DRAPE 1000 17X11IN

## (undated) DEVICE — COVER CAMERA OPERATING ROOM

## (undated) DEVICE — GLOVE SURG ULTRA TOUCH 7.5

## (undated) DEVICE — KIT ANTIFOG W/SPONG & FLUID

## (undated) DEVICE — UNDERGLOVES BIOGEL PI SIZE 8

## (undated) DEVICE — GAUZE SPONGE 4X4 12PLY

## (undated) DEVICE — SOL IRR SOD CHL .9% POUR

## (undated) DEVICE — PAD CAST SPECIALIST 2X4

## (undated) DEVICE — SYS LABEL CORRECT MED

## (undated) DEVICE — NDL ECLIPSE SAF REG 25GX1.5IN

## (undated) DEVICE — SYR GLASS 5CC LUER LOK

## (undated) DEVICE — SLING ARM LARGE FOAM STRAP